# Patient Record
Sex: FEMALE | Race: BLACK OR AFRICAN AMERICAN | Employment: FULL TIME | URBAN - METROPOLITAN AREA
[De-identification: names, ages, dates, MRNs, and addresses within clinical notes are randomized per-mention and may not be internally consistent; named-entity substitution may affect disease eponyms.]

---

## 2017-12-18 PROBLEM — I50.9 ACUTE HEART FAILURE (HCC): Status: ACTIVE | Noted: 2017-12-18

## 2017-12-18 PROBLEM — I50.9 ACUTE DECOMPENSATED HEART FAILURE (HCC): Status: ACTIVE | Noted: 2017-12-18

## 2017-12-18 PROBLEM — R07.89 ATYPICAL CHEST PAIN: Status: ACTIVE | Noted: 2017-12-18

## 2017-12-18 PROBLEM — I10 ESSENTIAL HYPERTENSION: Status: ACTIVE | Noted: 2017-12-18

## 2017-12-18 PROBLEM — E11.65 TYPE 2 DIABETES MELLITUS WITH HYPERGLYCEMIA, WITHOUT LONG-TERM CURRENT USE OF INSULIN (HCC): Status: ACTIVE | Noted: 2017-12-18

## 2017-12-18 PROBLEM — E11.9 DIABETES (HCC): Status: ACTIVE | Noted: 2017-12-18

## 2017-12-18 PROBLEM — I50.21 ACUTE SYSTOLIC HEART FAILURE (HCC): Status: ACTIVE | Noted: 2017-12-18

## 2017-12-18 PROBLEM — R07.9 CHEST PAIN: Status: ACTIVE | Noted: 2017-12-18

## 2017-12-26 ENCOUNTER — OFFICE VISIT (OUTPATIENT)
Dept: CARDIOLOGY CLINIC | Age: 47
End: 2017-12-26

## 2017-12-26 VITALS
SYSTOLIC BLOOD PRESSURE: 138 MMHG | DIASTOLIC BLOOD PRESSURE: 80 MMHG | WEIGHT: 214 LBS | HEIGHT: 64 IN | BODY MASS INDEX: 36.54 KG/M2 | HEART RATE: 95 BPM | OXYGEN SATURATION: 97 %

## 2017-12-26 DIAGNOSIS — I50.22 CHRONIC SYSTOLIC HEART FAILURE (HCC): Primary | ICD-10-CM

## 2017-12-26 LAB
ALBUMIN SERPL-MCNC: 4.1 G/DL (ref 3.4–5)
ANION GAP SERPL CALCULATED.3IONS-SCNC: 12 MMOL/L (ref 3–16)
BUN BLDV-MCNC: 18 MG/DL (ref 7–20)
CALCIUM SERPL-MCNC: 9.1 MG/DL (ref 8.3–10.6)
CHLORIDE BLD-SCNC: 105 MMOL/L (ref 99–110)
CO2: 28 MMOL/L (ref 21–32)
CREAT SERPL-MCNC: 1.1 MG/DL (ref 0.6–1.1)
GFR AFRICAN AMERICAN: >60
GFR NON-AFRICAN AMERICAN: 53
GLUCOSE BLD-MCNC: 142 MG/DL (ref 70–99)
PHOSPHORUS: 3.9 MG/DL (ref 2.5–4.9)
POTASSIUM SERPL-SCNC: 3.9 MMOL/L (ref 3.5–5.1)
SODIUM BLD-SCNC: 145 MMOL/L (ref 136–145)

## 2017-12-26 PROCEDURE — 99214 OFFICE O/P EST MOD 30 MIN: CPT | Performed by: INTERNAL MEDICINE

## 2017-12-26 NOTE — PROGRESS NOTES
[facility]  Cardiology Consult Note      Xavier Garcia  1970, 49 y.o.      CC: \" I was a little messy this weekend. \"              Aguilar Lesley:      HPI:   This is a 49 y.o. female who presented to the hospital with complaints of progressive shortness of breath, LE edema, and chest pains. The DURBIN worsened over the past 1-2 weeks to symptoms at rest with orthopnea. She endorsed chest \"tightness\" and decided to seek medical attention. She reported noncompliance with medications and CPAP. She smokes 1 ppd. Patient says she had an angiogram many years ago and had \"everything was normal.\"      Past Medical History:   Diagnosis Date    Diabetes mellitus ([address]     Hypertension     Hypokalemia     Nicotine addiction     LAURA (obstructive sleep apnea)       Past Surgical History:   Procedure Laterality Date    CARPAL TUNNEL RELEASE        History reviewed. No pertinent family history.    Social History   Substance Use Topics    Smoking status: Current Every Day Smoker     Packs/day: 0.75     Types: Cigarettes    Smokeless tobacco: Never Used    Alcohol use Yes      Comment: occ     No Known Allergies      Review of Systems -   Constitutional: Negative for weight gain/loss; malaise, fever  Respiratory: Negative for Asthma;  cough and hemoptysis  Cardiovascular: Negative for palpitations,dizziness   Gastrointestinal: Negative for abd.pain; constipation/diarrhea;    Genitourinary: Negative for stones; hematuria; frequency hesitancy  Integumentt: Negative for rash or pruritis  Hematologic/lymphatic: Negative for blood dyscrasia; leukemia/lymphoma  Musculoskeletal: Negative for Connective tissue disease  Neurological:  Negative for Seizure   Behavioral/Psych:Negative for Bipolar disorder, Schizophrenia; Dementia  Endocrine: negative for thyroid, parathyroid disease    Physical Examination:    /80 (Site: Left Arm, Position: Sitting)   Pulse 95   Ht 5' 4\" (1.626 m)   Wt 214 lb (97.1 kg) SpO2 97%   BMI 36.73 kg/m²    HEENT:  Face: Atraumatic, Conjunctiva: Pink; non icteric,  Mucous Memb:  Moist, No thyromegaly or Lymphadenopathy  Respiratory:  Resp Assessment: normal, Resp Auscultation: clear  Cardiovascular: Auscultation: nl S1 & S2, Palpation:  Nl PMI; No heaves or thrills, JVP:  normal  Abdomen: Soft, non-tender, Normal bowel sounds,  No organomegaly  Extremities: No Cyanosis or Clubbing  Neurological: Oriented to time, place, and person, Non-anxious  Psychiatric: Normal mood and affect  Skin: Warm and dry,  No rash seen     Outpatient Prescriptions Marked as Taking for the 12/26/17 encounter (Office Visit) with Bobo Abdi MD   Medication Sig Dispense Refill    aspirin 81 MG chewable tablet Take 1 tablet by mouth daily 30 tablet 3    atorvastatin (LIPITOR) 10 MG tablet Take 1 tablet by mouth daily 30 tablet 3    lisinopril (PRINIVIL;ZESTRIL) 20 MG tablet Take 1 tablet by mouth daily 30 tablet 3    carvedilol (COREG) 6.25 MG tablet Take 1 tablet by mouth 2 times daily (with meals) 60 tablet 3    furosemide (LASIX) 40 MG tablet Take 1 tablet by mouth daily 60 tablet 3    nicotine (NICODERM CQ) 14 MG/24HR Place 1 patch onto the skin daily 30 patch 3    potassium chloride (KLOR-CON) 20 MEQ packet Take 20 mEq by mouth daily 30 each 3    metFORMIN (GLUCOPHAGE) 500 MG tablet Take 500 mg by mouth 2 times daily (with meals)       albuterol sulfate  (90 Base) MCG/ACT inhaler Inhale 2 puffs into the lungs every 6 hours as needed for Wheezing or Shortness of Breath       Labs:   Lab Results   Component Value Date    HDL 36 12/19/2017    LDLCALC 122 12/19/2017    TRIG 116 12/19/2017       EKG: none today    ECHO: 12/19/17   Overall left ventricular systolic function appears moderately reduced.   Ejection fraction is visually estimated to be around 40%. Mildly dilated   left ventricle.  There is mildly increased left ventricular wall thickness.   Diastolic filling parameters suggest grade II diastolic dysfunction.   Normal right ventricular size and function.   The left atrium is mildly dilated.   Mild eccentric mitral regurgitation with posteriorly directed jet.   Trivial tricuspid regurgitation.   A bubble study was performed and fails to show evidence of right to left   shunting. ASSESSMENT AND PLAN:    Patient is here today for hsp f/u. She was taking medication as I walked into the exam room. She offers that she feels better since discharge. After further discussion, she reported having an angiogram several years ago and \"everything was normal.\"  I will defer to Dr. Dona Farfan to determine if stress test is needed. We discussed the importance of medication compliance, she verbalized understanding. She continues to smoke 1 ppd and says she is trying to cut back. Spent 3-5 minutes discussing smoking cessation. I will order renal panel today. She will need to follow up with Dr. Dona Farfan or Yang Seals in 3 months. Thank you very much for allowing me to participate in the care of your patient. Please do not hesitate to contact me if you have any questions.         Sincerely,    Caro Pascal M.D  [facility], [address] [place] [address]  Ph: [phone]  Fax: [phone]

## 2017-12-26 NOTE — PATIENT INSTRUCTIONS
sodium\" may still have too much sodium. Be sure to read the label to see how much sodium you are getting. · Buy fresh vegetables, or frozen vegetables without added sauces. Buy low-sodium versions of canned vegetables, soups, and other canned goods. Prepare low-sodium meals  · Cut back on the amount of salt you use in cooking. This will help you adjust to the taste. Do not add salt after cooking. One teaspoon of salt has about 2,300 mg of sodium. · Take the salt shaker off the table. · Flavor your food with garlic, lemon juice, onion, vinegar, herbs, and spices. Do not use soy sauce, lite soy sauce, steak sauce, onion salt, garlic salt, celery salt, mustard, or ketchup on your food. · Use low-sodium salad dressings, sauces, and ketchup. Or make your own salad dressings and sauces without adding salt. · Use less salt (or none) when recipes call for it. You can often use half the salt a recipe calls for without losing flavor. Other foods such as rice, pasta, and grains do not need added salt. · Rinse canned vegetables, and cook them in fresh water. This removes some-but not all-of the salt. · Avoid water that is naturally high in sodium or that has been treated with water softeners, which add sodium. Call your local water company to find out the sodium content of your water supply. If you buy bottled water, read the label and choose a sodium-free brand. Avoid high-sodium foods  · Avoid eating:  ¨ Smoked, cured, salted, and canned meat, fish, and poultry. ¨ Ham, melissa, hot dogs, and luncheon meats. ¨ Regular, hard, and processed cheese and regular peanut butter. ¨ Crackers with salted tops, and other salted snack foods such as pretzels, chips, and salted popcorn. ¨ Frozen prepared meals, unless labeled low-sodium. ¨ Canned and dried soups, broths, and bouillon, unless labeled sodium-free or low-sodium. ¨ Canned vegetables, unless labeled sodium-free or low-sodium.   ¨ Western Kayla fries, pizza, tacos, and other fast foods. ¨ Pickles, olives, ketchup, and other condiments, especially soy sauce, unless labeled sodium-free or low-sodium. Where can you learn more? Go to https://[employer]. org and sign in to your [employer] account. Enter S600 in the [facility] box to learn more about \"Low Sodium Diet (2,000 Milligram): Care Instructions. \"     If you do not have an account, please click on the \"Sign Up Now\" link. Current as of: May 12, 2017  Content Version: 11.4  © 5077-5608 [employer]. Care instructions adapted under license by [facility] ([facility]). If you have questions about a medical condition or this instruction, always ask your healthcare professional. [address] any warranty or liability for your use of this information. Patient Education        Fluid Restriction: Care Instructions  Your Care Instructions    A buildup of fluid in the body can cause swelling and pain. Your doctor may suggest that you limit liquids, including foods that contain a lot of liquid. Limiting liquids is called fluid restriction. It will help your body get rid of the extra fluid. Your doctor may also recommend that you cut back on sodium in your diet. Keeping track of the amount of fluids you take in may help you feel better. It may also lower your risk of having to go to the hospital. Your doctor will tell you how much fluid you can have in a day. Follow-up care is a key part of your treatment and safety. Be sure to make and go to all appointments, and call your doctor if you are having problems. It's also a good idea to know your test results and keep a list of the medicines you take. How can you care for yourself at home? · Find a way of tracking the fluids you take in that works for you. Here are two methods you can try:  ¨ Write down how much you drink throughout the day. ¨ Keep a container filled with the amount of liquid allowed for the day.  As you drink liquids of salt a day, including all the salt you eat in cooked or packaged foods. Usually, you have to limit the amount of liquids you drink only if your heart failure is severe. Limiting sodium alone often is enough to help your body get rid of extra fluids. However, your doctor may tell you to limit your fluid intake to a set amount each day. Follow-up care is a key part of your treatment and safety. Be sure to make and go to all appointments, and call your doctor if you are having problems. It's also a good idea to know your test results and keep a list of the medicines you take. How can you care for yourself at home? Read food labels  · Read food labels on cans and food packages. The labels tell you how much sodium is in each serving. Make sure that you look at the serving size. If you eat more than the serving size, you have eaten more sodium than is listed for one serving. · Food labels also tell you the Percent Daily Value. If the Percent Daily Value says 50%, it means that you will get at least 50% of all the sodium you need for the entire day in one serving. Choose products with low Percent Daily Values for sodium. · Be aware that sodium can come in forms other than salt, including monosodium glutamate (MSG), sodium citrate, and sodium bicarbonate (baking soda). MSG is often added to Asian food. You can sometimes ask for food without MSG or salt. Buy low-sodium foods  · Buy foods that are labeled \"unsalted\" (no salt added), \"sodium-free\" (less than 5 mg of sodium per serving), or \"low-sodium\" (less than 140 mg of sodium per serving). A food labeled \"light sodium\" has less than half of the full-sodium version of that food. Foods labeled \"reduced-sodium\" may still have too much sodium. · Buy fresh vegetables or plain, frozen vegetables. Buy low-sodium versions of canned vegetables, soups, and other canned goods. Prepare low-sodium meals  · Use less salt each day when cooking.  Reducing salt in this way will

## 2018-05-10 ENCOUNTER — OFFICE VISIT (OUTPATIENT)
Dept: CARDIOLOGY CLINIC | Age: 48
End: 2018-05-10

## 2018-05-10 VITALS
BODY MASS INDEX: 34.66 KG/M2 | HEIGHT: 64 IN | HEART RATE: 93 BPM | SYSTOLIC BLOOD PRESSURE: 150 MMHG | DIASTOLIC BLOOD PRESSURE: 80 MMHG | OXYGEN SATURATION: 97 % | WEIGHT: 203 LBS

## 2018-05-10 DIAGNOSIS — R05.8 COUGH DUE TO ACE INHIBITOR: ICD-10-CM

## 2018-05-10 DIAGNOSIS — R07.9 EXERTIONAL CHEST PAIN: Primary | ICD-10-CM

## 2018-05-10 DIAGNOSIS — T46.4X5A COUGH DUE TO ACE INHIBITOR: ICD-10-CM

## 2018-05-10 DIAGNOSIS — I50.42 CHRONIC COMBINED SYSTOLIC AND DIASTOLIC HEART FAILURE (HCC): ICD-10-CM

## 2018-05-10 DIAGNOSIS — R60.0 BILATERAL LOWER EXTREMITY EDEMA: ICD-10-CM

## 2018-05-10 PROCEDURE — 99215 OFFICE O/P EST HI 40 MIN: CPT | Performed by: INTERNAL MEDICINE

## 2018-05-10 RX ORDER — METOPROLOL SUCCINATE 50 MG/1
50 TABLET, EXTENDED RELEASE ORAL DAILY
Qty: 30 TABLET | Refills: 3 | Status: SHIPPED | OUTPATIENT
Start: 2018-05-10 | End: 2019-05-08 | Stop reason: SDUPTHER

## 2018-05-10 RX ORDER — VALSARTAN AND HYDROCHLOROTHIAZIDE 320; 25 MG/1; MG/1
1 TABLET, FILM COATED ORAL DAILY
Qty: 30 TABLET | Refills: 3 | Status: SHIPPED | OUTPATIENT
Start: 2018-05-10 | End: 2018-10-18

## 2018-05-10 RX ORDER — SPIRONOLACTONE 25 MG/1
25 TABLET ORAL DAILY
Qty: 30 TABLET | Refills: 3 | Status: SHIPPED | OUTPATIENT
Start: 2018-05-10 | End: 2018-10-09 | Stop reason: SDUPTHER

## 2018-05-14 ENCOUNTER — HOSPITAL ENCOUNTER (OUTPATIENT)
Dept: NON INVASIVE DIAGNOSTICS | Age: 48
Discharge: HOME | End: 2018-05-14
Attending: INTERNAL MEDICINE | Admitting: INTERNAL MEDICINE

## 2018-05-14 DIAGNOSIS — I50.42 CHRONIC COMBINED SYSTOLIC AND DIASTOLIC HEART FAILURE (HCC): ICD-10-CM

## 2018-05-14 DIAGNOSIS — R07.9 CHEST PAIN: ICD-10-CM

## 2018-05-14 DIAGNOSIS — R60.0 BILATERAL LOWER EXTREMITY EDEMA: ICD-10-CM

## 2018-05-14 LAB
ANION GAP SERPL CALCULATED.3IONS-SCNC: 17 MMOL/L (ref 3–16)
BUN BLDV-MCNC: 13 MG/DL (ref 7–20)
CALCIUM SERPL-MCNC: 9.1 MG/DL (ref 8.3–10.6)
CHLORIDE BLD-SCNC: 103 MMOL/L (ref 99–110)
CO2: 27 MMOL/L (ref 21–32)
CREAT SERPL-MCNC: 0.9 MG/DL (ref 0.6–1.1)
GFR AFRICAN AMERICAN: >60
GFR NON-AFRICAN AMERICAN: >60
GLUCOSE BLD-MCNC: 163 MG/DL (ref 70–99)
POTASSIUM SERPL-SCNC: 3.6 MMOL/L (ref 3.5–5.1)
SODIUM BLD-SCNC: 147 MMOL/L (ref 136–145)

## 2018-06-13 ENCOUNTER — OFFICE VISIT (OUTPATIENT)
Dept: CARDIOLOGY CLINIC | Age: 48
End: 2018-06-13

## 2018-06-13 VITALS
WEIGHT: 202 LBS | HEART RATE: 75 BPM | HEIGHT: 64 IN | SYSTOLIC BLOOD PRESSURE: 134 MMHG | BODY MASS INDEX: 34.49 KG/M2 | OXYGEN SATURATION: 95 % | DIASTOLIC BLOOD PRESSURE: 80 MMHG

## 2018-06-13 DIAGNOSIS — I50.42 CHRONIC COMBINED SYSTOLIC AND DIASTOLIC HEART FAILURE (HCC): ICD-10-CM

## 2018-06-13 DIAGNOSIS — R07.9 CHEST PAIN, UNSPECIFIED TYPE: Primary | ICD-10-CM

## 2018-06-13 DIAGNOSIS — R05.9 COUGH: ICD-10-CM

## 2018-06-13 DIAGNOSIS — R60.0 BILATERAL LEG EDEMA: ICD-10-CM

## 2018-06-13 LAB
ALBUMIN SERPL-MCNC: 4.1 G/DL (ref 3.4–5)
ANION GAP SERPL CALCULATED.3IONS-SCNC: 11 MMOL/L (ref 3–16)
BUN BLDV-MCNC: 12 MG/DL (ref 7–20)
CALCIUM SERPL-MCNC: 9.2 MG/DL (ref 8.3–10.6)
CHLORIDE BLD-SCNC: 100 MMOL/L (ref 99–110)
CO2: 32 MMOL/L (ref 21–32)
CREAT SERPL-MCNC: 0.8 MG/DL (ref 0.6–1.1)
GFR AFRICAN AMERICAN: >60
GFR NON-AFRICAN AMERICAN: >60
GLUCOSE BLD-MCNC: 227 MG/DL (ref 70–99)
PHOSPHORUS: 4.2 MG/DL (ref 2.5–4.9)
POTASSIUM SERPL-SCNC: 3.8 MMOL/L (ref 3.5–5.1)
SODIUM BLD-SCNC: 143 MMOL/L (ref 136–145)

## 2018-06-13 PROCEDURE — 99214 OFFICE O/P EST MOD 30 MIN: CPT | Performed by: INTERNAL MEDICINE

## 2018-10-09 RX ORDER — SPIRONOLACTONE 25 MG/1
25 TABLET ORAL DAILY
Qty: 30 TABLET | Refills: 3 | Status: SHIPPED | OUTPATIENT
Start: 2018-10-09 | End: 2019-05-01

## 2018-10-18 ENCOUNTER — OFFICE VISIT (OUTPATIENT)
Dept: CARDIOLOGY CLINIC | Age: 48
End: 2018-10-18
Payer: COMMERCIAL

## 2018-10-18 VITALS
HEART RATE: 72 BPM | BODY MASS INDEX: 36.7 KG/M2 | OXYGEN SATURATION: 98 % | HEIGHT: 64 IN | DIASTOLIC BLOOD PRESSURE: 84 MMHG | SYSTOLIC BLOOD PRESSURE: 138 MMHG | WEIGHT: 215 LBS

## 2018-10-18 DIAGNOSIS — I50.42 CHRONIC COMBINED SYSTOLIC AND DIASTOLIC HEART FAILURE (HCC): Primary | ICD-10-CM

## 2018-10-18 DIAGNOSIS — R60.0 LEG EDEMA: ICD-10-CM

## 2018-10-18 DIAGNOSIS — Z72.0 TOBACCO ABUSE: ICD-10-CM

## 2018-10-18 PROCEDURE — 99214 OFFICE O/P EST MOD 30 MIN: CPT | Performed by: INTERNAL MEDICINE

## 2018-10-18 RX ORDER — FUROSEMIDE 40 MG/1
40 TABLET ORAL DAILY
Qty: 180 TABLET | Refills: 5 | Status: SHIPPED | OUTPATIENT
Start: 2018-10-18 | End: 2019-05-24 | Stop reason: SDUPTHER

## 2018-10-18 RX ORDER — LOSARTAN POTASSIUM AND HYDROCHLOROTHIAZIDE 25; 100 MG/1; MG/1
1 TABLET ORAL DAILY
Qty: 90 TABLET | Refills: 5 | Status: SHIPPED | OUTPATIENT
Start: 2018-10-18 | End: 2019-05-10 | Stop reason: SDUPTHER

## 2018-11-14 ENCOUNTER — TELEPHONE (OUTPATIENT)
Dept: CARDIOLOGY CLINIC | Age: 48
End: 2018-11-14

## 2018-11-19 NOTE — TELEPHONE ENCOUNTER
May hold aldactone and see for one month.  If hair loss improves than stay off it; otherwise resume in a month

## 2018-11-23 ENCOUNTER — HOSPITAL ENCOUNTER (EMERGENCY)
Age: 48
Discharge: HOME | End: 2018-11-23
Payer: COMMERCIAL

## 2018-11-23 VITALS
TEMPERATURE: 97.8 F | DIASTOLIC BLOOD PRESSURE: 86 MMHG | SYSTOLIC BLOOD PRESSURE: 141 MMHG | OXYGEN SATURATION: 98 % | WEIGHT: 213.85 LBS | BODY MASS INDEX: 36.71 KG/M2 | HEART RATE: 87 BPM | RESPIRATION RATE: 18 BRPM

## 2018-11-23 DIAGNOSIS — S39.012A STRAIN OF LUMBAR REGION, INITIAL ENCOUNTER: ICD-10-CM

## 2018-11-23 DIAGNOSIS — J04.0 LARYNGITIS: Primary | ICD-10-CM

## 2018-11-23 LAB
EKG ATRIAL RATE: 79 BPM
EKG DIAGNOSIS: NORMAL
EKG P AXIS: 62 DEGREES
EKG P-R INTERVAL: 166 MS
EKG Q-T INTERVAL: 366 MS
EKG QRS DURATION: 84 MS
EKG QTC CALCULATION (BAZETT): 419 MS
EKG R AXIS: 5 DEGREES
EKG T AXIS: 171 DEGREES
EKG VENTRICULAR RATE: 79 BPM

## 2018-11-23 PROCEDURE — 93005 ELECTROCARDIOGRAM TRACING: CPT | Performed by: EMERGENCY MEDICINE

## 2018-11-23 PROCEDURE — 93010 ELECTROCARDIOGRAM REPORT: CPT | Performed by: INTERNAL MEDICINE

## 2018-11-23 PROCEDURE — 99283 EMERGENCY DEPT VISIT LOW MDM: CPT

## 2018-11-23 RX ORDER — ORPHENADRINE CITRATE 100 MG/1
100 TABLET, EXTENDED RELEASE ORAL 2 TIMES DAILY
Qty: 14 TABLET | Refills: 0 | Status: SHIPPED | OUTPATIENT
Start: 2018-11-23 | End: 2018-11-30

## 2018-11-23 RX ORDER — ALBUTEROL SULFATE 90 UG/1
2 AEROSOL, METERED RESPIRATORY (INHALATION) EVERY 6 HOURS PRN
Qty: 1 INHALER | Refills: 1 | Status: SHIPPED | OUTPATIENT
Start: 2018-11-23 | End: 2018-11-25

## 2018-11-23 ASSESSMENT — PAIN DESCRIPTION - PAIN TYPE
TYPE: ACUTE PAIN
TYPE: ACUTE PAIN

## 2018-11-23 ASSESSMENT — ENCOUNTER SYMPTOMS
GASTROINTESTINAL NEGATIVE: 1
BACK PAIN: 1
VOICE CHANGE: 1
RESPIRATORY NEGATIVE: 1

## 2018-11-23 ASSESSMENT — PAIN SCALES - GENERAL
PAINLEVEL_OUTOF10: 8
PAINLEVEL_OUTOF10: 4

## 2018-11-23 ASSESSMENT — PAIN DESCRIPTION - DESCRIPTORS: DESCRIPTORS: ACHING

## 2018-11-24 NOTE — ED PROVIDER NOTES
of swelling. No evidence of epiglottitis. No drooling. She speaks in full sentences and articulates well. Eyes: Pupils are equal, round, and reactive to light. Neck: Normal range of motion. Neck supple. No JVD present. Cardiovascular: Normal rate and regular rhythm. Pulmonary/Chest: Effort normal and breath sounds normal. No stridor. No respiratory distress. She has no wheezes. Neurological: She is alert and oriented to person, place, and time. Skin: Skin is warm and dry. Capillary refill takes less than 2 seconds. She is not diaphoretic. Nursing note and vitals reviewed. DIAGNOSTIC RESULTS     RADIOLOGY:   Non-plain film images such as CT, Ultrasound and MRI are read by the radiologist. Plain radiographic images are visualized and preliminarily interpreted by ALAB Jorge CNP with the below findings:        Interpretation per the Radiologist below, if available at the time of this note:    No orders to display     EKG reviewed per myself and interpreted per Dr. Ari Coleman sinus rhythm. No evidence of STEMI or acute ischemia. Ventricular rate 79, W[place] interval 166, QRS 84, . LABS:  Labs Reviewed - No data to display    All other labs were within normal range or not returned as of this dictation. EMERGENCY DEPARTMENT COURSE and DIFFERENTIAL DIAGNOSIS/MDM:   Vitals:    Vitals:    11/23/18 1059 11/23/18 1154   BP: 137/74 (!) 141/86   Pulse: 93 87   Resp: 16 18   Temp: 97.8 °F (36.6 °C)    TempSrc: Oral    SpO2: 97% 98%   Weight: 213 lb 13.5 oz (97 kg)      Medications - No data to display    MDM  Patient was seen and evaluated per myself. Dr. Ari Coleman was present and available for consultation as needed. EKG was performed per nursing protocol. Patient is not having shortness of breath or chest pain. Patient denied having chest pain or active shortness of breath during my exam. She does have a laryngitic voice.  Head ears eyes nose and throat exam is negative for any evidence of

## 2018-11-25 ENCOUNTER — HOSPITAL ENCOUNTER (EMERGENCY)
Age: 48
Discharge: HOME | End: 2018-11-25
Attending: EMERGENCY MEDICINE
Payer: COMMERCIAL

## 2018-11-25 ENCOUNTER — APPOINTMENT (OUTPATIENT)
Dept: GENERAL RADIOLOGY | Age: 48
End: 2018-11-25
Payer: COMMERCIAL

## 2018-11-25 VITALS
HEART RATE: 81 BPM | SYSTOLIC BLOOD PRESSURE: 154 MMHG | BODY MASS INDEX: 36.56 KG/M2 | DIASTOLIC BLOOD PRESSURE: 89 MMHG | TEMPERATURE: 98.1 F | RESPIRATION RATE: 18 BRPM | WEIGHT: 213 LBS | OXYGEN SATURATION: 96 %

## 2018-11-25 DIAGNOSIS — J04.0 LARYNGITIS: Primary | ICD-10-CM

## 2018-11-25 DIAGNOSIS — R05.9 COUGH: ICD-10-CM

## 2018-11-25 LAB
A/G RATIO: 1.2 (ref 1.1–2.2)
ALBUMIN SERPL-MCNC: 3.9 G/DL (ref 3.4–5)
ALP BLD-CCNC: 87 U/L (ref 40–129)
ALT SERPL-CCNC: 11 U/L (ref 10–40)
ANION GAP SERPL CALCULATED.3IONS-SCNC: 9 MMOL/L (ref 3–16)
AST SERPL-CCNC: 14 U/L (ref 15–37)
BASOPHILS ABSOLUTE: 0.1 K/UL (ref 0–0.2)
BASOPHILS RELATIVE PERCENT: 1.2 %
BILIRUB SERPL-MCNC: <0.2 MG/DL (ref 0–1)
BILIRUBIN URINE: NEGATIVE
BLOOD, URINE: ABNORMAL
BUN BLDV-MCNC: 12 MG/DL (ref 7–20)
CALCIUM SERPL-MCNC: 8.9 MG/DL (ref 8.3–10.6)
CHLORIDE BLD-SCNC: 102 MMOL/L (ref 99–110)
CLARITY: CLEAR
CO2: 31 MMOL/L (ref 21–32)
COLOR: YELLOW
CREAT SERPL-MCNC: 0.8 MG/DL (ref 0.6–1.1)
EOSINOPHILS ABSOLUTE: 0.3 K/UL (ref 0–0.6)
EOSINOPHILS RELATIVE PERCENT: 2.3 %
EPITHELIAL CELLS, UA: 2 /HPF (ref 0–5)
GFR AFRICAN AMERICAN: >60
GFR NON-AFRICAN AMERICAN: >60
GLOBULIN: 3.3 G/DL
GLUCOSE BLD-MCNC: 180 MG/DL (ref 70–99)
GLUCOSE URINE: NEGATIVE MG/DL
GONADOTROPIN, CHORIONIC (HCG) QUANT: 6.3 MIU/ML
HCG QUALITATIVE: POSITIVE
HCT VFR BLD CALC: 41.9 % (ref 36–48)
HEMOGLOBIN: 13.8 G/DL (ref 12–16)
HYALINE CASTS: 0 /LPF (ref 0–8)
KETONES, URINE: NEGATIVE MG/DL
LEUKOCYTE ESTERASE, URINE: NEGATIVE
LYMPHOCYTES ABSOLUTE: 3 K/UL (ref 1–5.1)
LYMPHOCYTES RELATIVE PERCENT: 23.8 %
MCH RBC QN AUTO: 26.1 PG (ref 26–34)
MCHC RBC AUTO-ENTMCNC: 32.8 G/DL (ref 31–36)
MCV RBC AUTO: 79.3 FL (ref 80–100)
MICROSCOPIC EXAMINATION: YES
MONOCYTES ABSOLUTE: 1.1 K/UL (ref 0–1.3)
MONOCYTES RELATIVE PERCENT: 9.1 %
NEUTROPHILS ABSOLUTE: 7.9 K/UL (ref 1.7–7.7)
NEUTROPHILS RELATIVE PERCENT: 63.6 %
NITRITE, URINE: NEGATIVE
PDW BLD-RTO: 14.8 % (ref 12.4–15.4)
PH UA: 6
PLATELET # BLD: 272 K/UL (ref 135–450)
PMV BLD AUTO: 9.6 FL (ref 5–10.5)
POTASSIUM SERPL-SCNC: 3.4 MMOL/L (ref 3.5–5.1)
PRO-BNP: 1218 PG/ML (ref 0–124)
PROTEIN UA: NEGATIVE MG/DL
RBC # BLD: 5.28 M/UL (ref 4–5.2)
RBC UA: 3 /HPF (ref 0–4)
SODIUM BLD-SCNC: 142 MMOL/L (ref 136–145)
SPECIFIC GRAVITY UA: 1.01
TOTAL PROTEIN: 7.2 G/DL (ref 6.4–8.2)
TROPONIN: <0.01 NG/ML
TROPONIN: <0.01 NG/ML
URINE REFLEX TO CULTURE: ABNORMAL
URINE TYPE: ABNORMAL
UROBILINOGEN, URINE: 0.2 E.U./DL
WBC # BLD: 12.4 K/UL (ref 4–11)
WBC UA: 1 /HPF (ref 0–5)

## 2018-11-25 PROCEDURE — 94640 AIRWAY INHALATION TREATMENT: CPT

## 2018-11-25 PROCEDURE — 6360000002 HC RX W HCPCS: Performed by: NURSE PRACTITIONER

## 2018-11-25 PROCEDURE — 84702 CHORIONIC GONADOTROPIN TEST: CPT

## 2018-11-25 PROCEDURE — 99284 EMERGENCY DEPT VISIT MOD MDM: CPT

## 2018-11-25 PROCEDURE — 94664 DEMO&/EVAL PT USE INHALER: CPT

## 2018-11-25 PROCEDURE — 83880 ASSAY OF NATRIURETIC PEPTIDE: CPT

## 2018-11-25 PROCEDURE — 84484 ASSAY OF TROPONIN QUANT: CPT

## 2018-11-25 PROCEDURE — 6370000000 HC RX 637 (ALT 250 FOR IP): Performed by: NURSE PRACTITIONER

## 2018-11-25 PROCEDURE — 84703 CHORIONIC GONADOTROPIN ASSAY: CPT

## 2018-11-25 PROCEDURE — 93005 ELECTROCARDIOGRAM TRACING: CPT | Performed by: NURSE PRACTITIONER

## 2018-11-25 PROCEDURE — 80053 COMPREHEN METABOLIC PANEL: CPT

## 2018-11-25 PROCEDURE — 71046 X-RAY EXAM CHEST 2 VIEWS: CPT

## 2018-11-25 PROCEDURE — 96374 THER/PROPH/DIAG INJ IV PUSH: CPT

## 2018-11-25 PROCEDURE — 81001 URINALYSIS AUTO W/SCOPE: CPT

## 2018-11-25 PROCEDURE — 85025 COMPLETE CBC W/AUTO DIFF WBC: CPT

## 2018-11-25 PROCEDURE — 94761 N-INVAS EAR/PLS OXIMETRY MLT: CPT

## 2018-11-25 RX ORDER — ALBUTEROL SULFATE 90 UG/1
AEROSOL, METERED RESPIRATORY (INHALATION)
Qty: 1 INHALER | Refills: 3 | Status: SHIPPED | OUTPATIENT
Start: 2018-11-25

## 2018-11-25 RX ORDER — DEXAMETHASONE SODIUM PHOSPHATE 4 MG/ML
10 INJECTION, SOLUTION INTRA-ARTICULAR; INTRALESIONAL; INTRAMUSCULAR; INTRAVENOUS; SOFT TISSUE ONCE
Status: COMPLETED | OUTPATIENT
Start: 2018-11-25 | End: 2018-11-25

## 2018-11-25 RX ORDER — IBUPROFEN 800 MG/1
800 TABLET ORAL EVERY 8 HOURS PRN
Qty: 30 TABLET | Refills: 0 | Status: SHIPPED | OUTPATIENT
Start: 2018-11-25 | End: 2019-05-08 | Stop reason: CLARIF

## 2018-11-25 RX ORDER — IPRATROPIUM BROMIDE AND ALBUTEROL SULFATE 2.5; .5 MG/3ML; MG/3ML
1 SOLUTION RESPIRATORY (INHALATION) ONCE
Status: COMPLETED | OUTPATIENT
Start: 2018-11-25 | End: 2018-11-25

## 2018-11-25 RX ORDER — BENZONATATE 100 MG/1
100 CAPSULE ORAL 3 TIMES DAILY PRN
Qty: 18 CAPSULE | Refills: 0 | Status: SHIPPED | OUTPATIENT
Start: 2018-11-25 | End: 2018-12-01

## 2018-11-25 RX ADMIN — DEXAMETHASONE SODIUM PHOSPHATE 10 MG: 4 INJECTION, SOLUTION INTRAMUSCULAR; INTRAVENOUS at 19:12

## 2018-11-25 RX ADMIN — IPRATROPIUM BROMIDE AND ALBUTEROL SULFATE 1 AMPULE: .5; 3 SOLUTION RESPIRATORY (INHALATION) at 18:05

## 2018-11-25 ASSESSMENT — ENCOUNTER SYMPTOMS
SINUS PAIN: 0
VOMITING: 0
SHORTNESS OF BREATH: 0
ABDOMINAL PAIN: 0
DIARRHEA: 0
TROUBLE SWALLOWING: 0
SORE THROAT: 1
NAUSEA: 0
VOICE CHANGE: 1
COUGH: 1

## 2018-11-25 NOTE — ED PROVIDER NOTES
Maxine Richter verbalized understanding and discharged home. Emergency room course included a breathing treatment and steroids. Patient was resting comfortably on my reassessment. This is an unhealthy 39-year-old female that smokes and does not wear CPAP for her LAURA. Initial and repeat troponin were both negative with a normal EKG my clinical suspicion for ACS is low. She had a normal stress test at the beginning of this year. She feels better and will be given a prescription for cough medicine and albuterol inhaler. I advised her to follow-up with her PCP. I feel she is appropriate and safe for discharge home at this time. The patient has been evaluated and the history and physical exam suggest a benign etiology. Patient's oxygen saturations are good. I do not believe the patient's symptoms today are due to pulmonary embolism, pulmonary edema, pneumonia, pneumothorax, ACS, CHF, MI, thoracic aortic dissection, significant pericarditis, or acute abdomen, status asthmaticus, acute respiratory failure, profound anemia or metabolic abnormality, amongst other more emergent diagnostic considerations. I feel the patient can be safely discharged to home with outpatient follow up. Instructions have been given for the patient to return to the Emergency Department Immediately for any worsening of the symptoms, including but not limited to increased pain, shortness of breath, abdominal pain or weakness. At this time, the evidence for any other entities in the differential is insufficient to justify any further testing. This was explained to the patient. The patient was advised that persistent or worsening symptoms will require further evaluation. The patient tolerated their visit well. They were seen and evaluated by the attending physician, Colten Knott MD who agreed with the assessment and plan.   The patient and / or the family were informed of the results of any tests, a time was given to answer questions, a plan was proposed and they agreed with plan. FINAL IMPRESSION      1. Laryngitis    2.  Cough          DISPOSITION/PLAN   DISPOSITION        PATIENT REFERREDTO:  [facility]  [phone]    Schedule an appointment as soon as possible for a visit       Blanca Goss, [address] [facility] [address] [place] [address]  [phone]    Schedule an appointment as soon as possible for a visit       [address] [facility] Emergency Department  [address] [id number] [phone]  Go to   As needed      DISCHARGE MEDICATIONS:  Discharge Medication List as of 11/25/2018  9:13 PM      START taking these medications    Details   ibuprofen (ADVIL;MOTRIN) 800 MG tablet Take 1 tablet by mouth every 8 hours as needed for Pain, Disp-30 tablet, R-0Print      benzonatate (TESSALON PERLES) 100 MG capsule Take 1 capsule by mouth 3 times daily as needed for Cough, Disp-18 capsule, R-0Print             DISCONTINUED MEDICATIONS:  Discharge Medication List as of 11/25/2018  9:13 PM                 (Please note that portions ofthis note were completed with a voice recognition program.  Efforts were made to edit the dictations but occasionally words are mis-transcribed.)    ALBA Saunders CNP (electronically signed)            ALBA Saunders CNP  11/26/18 0216

## 2018-11-25 NOTE — ED NOTES
Bed: S-46  Expected date:   Expected time:   Means of arrival:   Comments:  Allegra Serrano RN  11/25/18 4254

## 2018-11-26 LAB
EKG ATRIAL RATE: 70 BPM
EKG DIAGNOSIS: NORMAL
EKG P AXIS: 66 DEGREES
EKG P-R INTERVAL: 164 MS
EKG Q-T INTERVAL: 416 MS
EKG QRS DURATION: 86 MS
EKG QTC CALCULATION (BAZETT): 449 MS
EKG R AXIS: 66 DEGREES
EKG T AXIS: -1 DEGREES
EKG VENTRICULAR RATE: 70 BPM

## 2018-11-26 PROCEDURE — 93010 ELECTROCARDIOGRAM REPORT: CPT | Performed by: INTERNAL MEDICINE

## 2019-05-01 ENCOUNTER — APPOINTMENT (OUTPATIENT)
Dept: GENERAL RADIOLOGY | Age: 49
DRG: 293 | End: 2019-05-01
Payer: COMMERCIAL

## 2019-05-01 ENCOUNTER — HOSPITAL ENCOUNTER (INPATIENT)
Age: 49
LOS: 1 days | Discharge: HOME | DRG: 293 | End: 2019-05-03
Attending: EMERGENCY MEDICINE | Admitting: HOSPITALIST
Payer: COMMERCIAL

## 2019-05-01 DIAGNOSIS — R77.8 ELEVATED TROPONIN: ICD-10-CM

## 2019-05-01 DIAGNOSIS — I50.9 ACUTE ON CHRONIC CONGESTIVE HEART FAILURE, UNSPECIFIED HEART FAILURE TYPE (HCC): Primary | ICD-10-CM

## 2019-05-01 PROBLEM — R07.9 CHEST PAIN: Status: ACTIVE | Noted: 2019-05-01

## 2019-05-01 LAB
A/G RATIO: 1.3 (ref 1.1–2.2)
ALBUMIN SERPL-MCNC: 3.5 G/DL (ref 3.4–5)
ALP BLD-CCNC: 114 U/L (ref 40–129)
ALT SERPL-CCNC: 55 U/L (ref 10–40)
ANION GAP SERPL CALCULATED.3IONS-SCNC: 10 MMOL/L (ref 3–16)
AST SERPL-CCNC: 59 U/L (ref 15–37)
BASOPHILS ABSOLUTE: 0.1 K/UL (ref 0–0.2)
BASOPHILS RELATIVE PERCENT: 0.9 %
BILIRUB SERPL-MCNC: 0.3 MG/DL (ref 0–1)
BUN BLDV-MCNC: 13 MG/DL (ref 7–20)
CALCIUM SERPL-MCNC: 8.8 MG/DL (ref 8.3–10.6)
CHLORIDE BLD-SCNC: 103 MMOL/L (ref 99–110)
CO2: 29 MMOL/L (ref 21–32)
CREAT SERPL-MCNC: 1 MG/DL (ref 0.6–1.1)
EOSINOPHILS ABSOLUTE: 0.3 K/UL (ref 0–0.6)
EOSINOPHILS RELATIVE PERCENT: 3.3 %
GFR AFRICAN AMERICAN: >60
GFR NON-AFRICAN AMERICAN: 59
GLOBULIN: 2.8 G/DL
GLUCOSE BLD-MCNC: 155 MG/DL (ref 70–99)
HCG QUALITATIVE: NEGATIVE
HCT VFR BLD CALC: 37.6 % (ref 36–48)
HEMOGLOBIN: 12.5 G/DL (ref 12–16)
LV EF: 40 %
LV EF: 40 %
LVEF MODALITY: NORMAL
LVEF MODALITY: NORMAL
LYMPHOCYTES ABSOLUTE: 2.9 K/UL (ref 1–5.1)
LYMPHOCYTES RELATIVE PERCENT: 27.1 %
MCH RBC QN AUTO: 25.6 PG (ref 26–34)
MCHC RBC AUTO-ENTMCNC: 33.2 G/DL (ref 31–36)
MCV RBC AUTO: 77.1 FL (ref 80–100)
MONOCYTES ABSOLUTE: 0.9 K/UL (ref 0–1.3)
MONOCYTES RELATIVE PERCENT: 8.2 %
NEUTROPHILS ABSOLUTE: 6.4 K/UL (ref 1.7–7.7)
NEUTROPHILS RELATIVE PERCENT: 60.5 %
PDW BLD-RTO: 15.1 % (ref 12.4–15.4)
PLATELET # BLD: 278 K/UL (ref 135–450)
PMV BLD AUTO: 9.6 FL (ref 5–10.5)
POTASSIUM SERPL-SCNC: 3.4 MMOL/L (ref 3.5–5.1)
PRO-BNP: 3414 PG/ML (ref 0–124)
RBC # BLD: 4.88 M/UL (ref 4–5.2)
SODIUM BLD-SCNC: 142 MMOL/L (ref 136–145)
TOTAL PROTEIN: 6.3 G/DL (ref 6.4–8.2)
TROPONIN: 0.04 NG/ML
TROPONIN: 0.04 NG/ML
TROPONIN: <0.01 NG/ML
WBC # BLD: 10.5 K/UL (ref 4–11)

## 2019-05-01 PROCEDURE — 84703 CHORIONIC GONADOTROPIN ASSAY: CPT

## 2019-05-01 PROCEDURE — 80053 COMPREHEN METABOLIC PANEL: CPT

## 2019-05-01 PROCEDURE — G0378 HOSPITAL OBSERVATION PER HR: HCPCS

## 2019-05-01 PROCEDURE — 93017 CV STRESS TEST TRACING ONLY: CPT

## 2019-05-01 PROCEDURE — 6360000002 HC RX W HCPCS: Performed by: PHYSICIAN ASSISTANT

## 2019-05-01 PROCEDURE — 93005 ELECTROCARDIOGRAM TRACING: CPT | Performed by: PHYSICIAN ASSISTANT

## 2019-05-01 PROCEDURE — A9502 TC99M TETROFOSMIN: HCPCS | Performed by: INTERNAL MEDICINE

## 2019-05-01 PROCEDURE — 36415 COLL VENOUS BLD VENIPUNCTURE: CPT

## 2019-05-01 PROCEDURE — 6370000000 HC RX 637 (ALT 250 FOR IP): Performed by: PHYSICIAN ASSISTANT

## 2019-05-01 PROCEDURE — 6360000002 HC RX W HCPCS: Performed by: INTERNAL MEDICINE

## 2019-05-01 PROCEDURE — 99285 EMERGENCY DEPT VISIT HI MDM: CPT

## 2019-05-01 PROCEDURE — 84484 ASSAY OF TROPONIN QUANT: CPT

## 2019-05-01 PROCEDURE — 71046 X-RAY EXAM CHEST 2 VIEWS: CPT

## 2019-05-01 PROCEDURE — 2580000003 HC RX 258: Performed by: INTERNAL MEDICINE

## 2019-05-01 PROCEDURE — 94760 N-INVAS EAR/PLS OXIMETRY 1: CPT

## 2019-05-01 PROCEDURE — 6370000000 HC RX 637 (ALT 250 FOR IP): Performed by: INTERNAL MEDICINE

## 2019-05-01 PROCEDURE — 93010 ELECTROCARDIOGRAM REPORT: CPT | Performed by: INTERNAL MEDICINE

## 2019-05-01 PROCEDURE — 78452 HT MUSCLE IMAGE SPECT MULT: CPT

## 2019-05-01 PROCEDURE — 96372 THER/PROPH/DIAG INJ SC/IM: CPT

## 2019-05-01 PROCEDURE — 3430000000 HC RX DIAGNOSTIC RADIOPHARMACEUTICAL: Performed by: INTERNAL MEDICINE

## 2019-05-01 PROCEDURE — 96374 THER/PROPH/DIAG INJ IV PUSH: CPT

## 2019-05-01 PROCEDURE — 96376 TX/PRO/DX INJ SAME DRUG ADON: CPT

## 2019-05-01 PROCEDURE — 85025 COMPLETE CBC W/AUTO DIFF WBC: CPT

## 2019-05-01 PROCEDURE — 93306 TTE W/DOPPLER COMPLETE: CPT

## 2019-05-01 PROCEDURE — 83880 ASSAY OF NATRIURETIC PEPTIDE: CPT

## 2019-05-01 RX ORDER — ACETAMINOPHEN 325 MG/1
650 TABLET ORAL EVERY 4 HOURS PRN
Status: DISCONTINUED | OUTPATIENT
Start: 2019-05-01 | End: 2019-05-03 | Stop reason: HOSPADM

## 2019-05-01 RX ORDER — ASPIRIN 81 MG/1
81 TABLET, CHEWABLE ORAL DAILY
Status: DISCONTINUED | OUTPATIENT
Start: 2019-05-01 | End: 2019-05-03 | Stop reason: HOSPADM

## 2019-05-01 RX ORDER — NICOTINE 21 MG/24HR
1 PATCH, TRANSDERMAL 24 HOURS TRANSDERMAL DAILY
Status: DISCONTINUED | OUTPATIENT
Start: 2019-05-01 | End: 2019-05-03 | Stop reason: HOSPADM

## 2019-05-01 RX ORDER — ALBUTEROL SULFATE 90 UG/1
2 AEROSOL, METERED RESPIRATORY (INHALATION) EVERY 6 HOURS PRN
Status: DISCONTINUED | OUTPATIENT
Start: 2019-05-01 | End: 2019-05-03 | Stop reason: HOSPADM

## 2019-05-01 RX ORDER — ATORVASTATIN CALCIUM 10 MG/1
10 TABLET, FILM COATED ORAL DAILY
Status: DISCONTINUED | OUTPATIENT
Start: 2019-05-01 | End: 2019-05-03 | Stop reason: HOSPADM

## 2019-05-01 RX ORDER — LOSARTAN POTASSIUM AND HYDROCHLOROTHIAZIDE 12.5; 5 MG/1; MG/1
2 TABLET ORAL DAILY
Status: DISCONTINUED | OUTPATIENT
Start: 2019-05-01 | End: 2019-05-03 | Stop reason: HOSPADM

## 2019-05-01 RX ORDER — METOPROLOL SUCCINATE 50 MG/1
50 TABLET, EXTENDED RELEASE ORAL DAILY
Status: DISCONTINUED | OUTPATIENT
Start: 2019-05-01 | End: 2019-05-03 | Stop reason: HOSPADM

## 2019-05-01 RX ORDER — FUROSEMIDE 10 MG/ML
40 INJECTION INTRAMUSCULAR; INTRAVENOUS ONCE
Status: COMPLETED | OUTPATIENT
Start: 2019-05-01 | End: 2019-05-01

## 2019-05-01 RX ORDER — ASPIRIN 81 MG/1
324 TABLET, CHEWABLE ORAL ONCE
Status: COMPLETED | OUTPATIENT
Start: 2019-05-01 | End: 2019-05-01

## 2019-05-01 RX ORDER — FUROSEMIDE 10 MG/ML
40 INJECTION INTRAMUSCULAR; INTRAVENOUS 2 TIMES DAILY
Status: DISCONTINUED | OUTPATIENT
Start: 2019-05-02 | End: 2019-05-03 | Stop reason: HOSPADM

## 2019-05-01 RX ORDER — FUROSEMIDE 10 MG/ML
40 INJECTION INTRAMUSCULAR; INTRAVENOUS DAILY
Status: DISCONTINUED | OUTPATIENT
Start: 2019-05-01 | End: 2019-05-01

## 2019-05-01 RX ORDER — ONDANSETRON 2 MG/ML
4 INJECTION INTRAMUSCULAR; INTRAVENOUS EVERY 6 HOURS PRN
Status: DISCONTINUED | OUTPATIENT
Start: 2019-05-01 | End: 2019-05-03 | Stop reason: HOSPADM

## 2019-05-01 RX ORDER — SODIUM CHLORIDE 0.9 % (FLUSH) 0.9 %
10 SYRINGE (ML) INJECTION PRN
Status: DISCONTINUED | OUTPATIENT
Start: 2019-05-01 | End: 2019-05-03 | Stop reason: HOSPADM

## 2019-05-01 RX ORDER — SODIUM CHLORIDE 0.9 % (FLUSH) 0.9 %
10 SYRINGE (ML) INJECTION EVERY 12 HOURS SCHEDULED
Status: DISCONTINUED | OUTPATIENT
Start: 2019-05-01 | End: 2019-05-03 | Stop reason: HOSPADM

## 2019-05-01 RX ORDER — POTASSIUM CHLORIDE 1.5 G/1.77G
40 POWDER, FOR SOLUTION ORAL ONCE
Status: COMPLETED | OUTPATIENT
Start: 2019-05-01 | End: 2019-05-01

## 2019-05-01 RX ADMIN — TETROFOSMIN 30 MILLICURIE: 0.23 INJECTION, POWDER, LYOPHILIZED, FOR SOLUTION INTRAVENOUS at 14:15

## 2019-05-01 RX ADMIN — FUROSEMIDE 40 MG: 10 INJECTION, SOLUTION INTRAMUSCULAR; INTRAVENOUS at 03:50

## 2019-05-01 RX ADMIN — FUROSEMIDE 40 MG: 10 INJECTION, SOLUTION INTRAMUSCULAR; INTRAVENOUS at 09:32

## 2019-05-01 RX ADMIN — ATORVASTATIN CALCIUM 10 MG: 10 TABLET, FILM COATED ORAL at 09:32

## 2019-05-01 RX ADMIN — NITROGLYCERIN 0.5 INCH: 20 OINTMENT TOPICAL at 11:59

## 2019-05-01 RX ADMIN — ASPIRIN 81 MG 324 MG: 81 TABLET ORAL at 04:12

## 2019-05-01 RX ADMIN — NITROGLYCERIN 0.5 INCH: 20 OINTMENT TOPICAL at 23:24

## 2019-05-01 RX ADMIN — Medication 10 ML: at 09:34

## 2019-05-01 RX ADMIN — POTASSIUM CHLORIDE 40 MEQ: 1.5 POWDER, FOR SOLUTION ORAL at 07:26

## 2019-05-01 RX ADMIN — ACETAMINOPHEN 650 MG: 325 TABLET ORAL at 07:27

## 2019-05-01 RX ADMIN — NITROGLYCERIN 0.5 INCH: 20 OINTMENT TOPICAL at 07:27

## 2019-05-01 RX ADMIN — ASPIRIN 81 MG 81 MG: 81 TABLET ORAL at 09:32

## 2019-05-01 RX ADMIN — NITROGLYCERIN 1 INCH: 20 OINTMENT TOPICAL at 04:13

## 2019-05-01 RX ADMIN — ENOXAPARIN SODIUM 40 MG: 40 INJECTION SUBCUTANEOUS at 09:32

## 2019-05-01 RX ADMIN — LOSARTAN POTASSIUM AND HYDROCHLOROTHIAZIDE 2 TABLET: 50; 12.5 TABLET, FILM COATED ORAL at 09:32

## 2019-05-01 RX ADMIN — Medication 10 ML: at 23:28

## 2019-05-01 RX ADMIN — NITROGLYCERIN 0.5 INCH: 20 OINTMENT TOPICAL at 18:06

## 2019-05-01 RX ADMIN — REGADENOSON 0.4 MG: 0.08 INJECTION, SOLUTION INTRAVENOUS at 14:26

## 2019-05-01 RX ADMIN — ACETAMINOPHEN 650 MG: 325 TABLET ORAL at 11:59

## 2019-05-01 ASSESSMENT — PAIN DESCRIPTION - ONSET
ONSET: ON-GOING

## 2019-05-01 ASSESSMENT — PAIN DESCRIPTION - PROGRESSION
CLINICAL_PROGRESSION: NOT CHANGED

## 2019-05-01 ASSESSMENT — ENCOUNTER SYMPTOMS
SHORTNESS OF BREATH: 1
ABDOMINAL PAIN: 0
NAUSEA: 1
VOMITING: 0

## 2019-05-01 ASSESSMENT — PAIN SCALES - GENERAL
PAINLEVEL_OUTOF10: 5
PAINLEVEL_OUTOF10: 0
PAINLEVEL_OUTOF10: 0
PAINLEVEL_OUTOF10: 3
PAINLEVEL_OUTOF10: 7
PAINLEVEL_OUTOF10: 2
PAINLEVEL_OUTOF10: 2
PAINLEVEL_OUTOF10: 4
PAINLEVEL_OUTOF10: 6

## 2019-05-01 ASSESSMENT — PAIN DESCRIPTION - LOCATION
LOCATION: HEAD
LOCATION: HEAD
LOCATION: CHEST

## 2019-05-01 ASSESSMENT — PAIN - FUNCTIONAL ASSESSMENT
PAIN_FUNCTIONAL_ASSESSMENT: ACTIVITIES ARE NOT PREVENTED

## 2019-05-01 ASSESSMENT — PAIN DESCRIPTION - FREQUENCY
FREQUENCY: CONTINUOUS
FREQUENCY: INTERMITTENT
FREQUENCY: INTERMITTENT

## 2019-05-01 ASSESSMENT — PAIN DESCRIPTION - ORIENTATION
ORIENTATION: LEFT
ORIENTATION: MID
ORIENTATION: ANTERIOR

## 2019-05-01 ASSESSMENT — PAIN DESCRIPTION - DESCRIPTORS
DESCRIPTORS: ACHING;DULL
DESCRIPTORS: HEADACHE
DESCRIPTORS: ACHING

## 2019-05-01 ASSESSMENT — PAIN DESCRIPTION - PAIN TYPE
TYPE: ACUTE PAIN

## 2019-05-01 ASSESSMENT — HEART SCORE: ECG: 1

## 2019-05-01 NOTE — PROGRESS NOTES
Patient admitted to room 4113 from ER via stretcher. Oriented to room, call light, and floor policies. Plan of care reviewed with patient/family. Tele in place reading sinus rhythm a rate of 73. Safety precautions in place; call light and bedside table within reach. Pt encouraged to call for needs or ambulation. Pt VU. Will continue to monitor.

## 2019-05-01 NOTE — PROGRESS NOTES
4 Eyes Skin Assessment     The patient is being assess for  Admission    I agree that 2 RN's have performed a thorough Head to Toe Skin Assessment on the patient. ALL assessment sites listed below have been assessed. Areas assessed by both nurses:   [x]   Head, Face, and Ears   [x]   Shoulders, Back, and Chest  [x]   Arms, Elbows, and Hands   [x]   Coccyx, Sacrum, and IschIum  [x]   Legs, Feet, and Heels        Does the Patient have Skin Breakdown?   No         Nathaniel Prevention initiated:  No   Wound Care Orders initiated:  No      [facility] nurse consulted for Pressure Injury (Stage 3,4, Unstageable, DTI, NWPT, and Complex wounds), New and Established Ostomies:  No      Nurse 1 eSignature: Electronically signed by Celina Sheldon RN on 5/1/19 at 6:45 PM    **SHARE this note so that the co-signing nurse is able to place an eSignature**    Nurse 2 eSignature: Electronically signed by Jalen Moore RN on 5/2/19 at 2:53 AM

## 2019-05-01 NOTE — FLOWSHEET NOTE
AD documents, explanatory information and contact information left with patient, who will contact us if further questions or if they wish to complete. 05/01/19 1213   Encounter Summary   Services provided to: Patient   Referral/Consult From: Nurse   Continue Visiting   (pt asleep. Left AD info.   5/1)   Complexity of Encounter Low   Length of Encounter 15 minutes   Advance Directives (For Healthcare)   Healthcare Directive No, patient does not have an advance directive for healthcare treatment   Advance Directives Documents given   [address]  Electronically signed by Effie Young on 5/1/2019 at 12:14 PM

## 2019-05-01 NOTE — PROGRESS NOTES
Patient alert and oriented x4, VSS, laying in bed resting comfortably. Pt denies n/v, diarrhea, SOB, pain, states does have a headache r/t nitro paste. Pt states no needs at this time. Bed in lowest position, non-slip socks on. Pt UAL and tolerating well. Call light within reach. Will continue to monitor pt needs.

## 2019-05-01 NOTE — ED PROVIDER NOTES
[facility]  eMERGENCY dEPARTMENT eNCOUnter      Pt Name: Jill Faye  MRN: [id number]  [place] 1970  Date of evaluation: 5/1/2019  Provider: ALLI Miller    CHIEF COMPLAINT       Chief Complaint   Patient presents with    Chest Pain     Patient states that she has been having intermittent chest tightness at a 6/10 for the last \"few days. \" Also accompanied by SOB, stating that the SOB began a few days before the chest tightness did.  Shortness of Breath         HISTORY OF PRESENT ILLNESS  (Location/Symptom, Timing/Onset, Context/Setting, Quality, Duration, Modifying Factors, Severity.)   Jill Faye is a 51 y.o. female who presents to the emergency department shortness of breath and chest pain. Patient reports chest pain that is left-sided and constant for the past few days. Tightness. Rated 6-7/10. Does radiate into her shoulder. No alleviating or aggravating factors. Also reports shortness of breath for the past few weeks. She reports nausea but denies vomiting abdominal pain fevers or chills. Does have CHF. She reports she was on spirnolactone 25 and Lasix 40. Reports she was taken off of the spironolactone due to hair loss. She also stopped the Lasix but wasn't sure if she was supposed to but then also reports she ran out of her prescription (?). She denies personal history of MI, CAD. She does have history of hypertension diabetes hyperlipidemia smokes half pack a day for 25-30 years has a positive family history of MI.        Nursing Notes were reviewed and I agree. REVIEW OF SYSTEMS    (2-9 systems for level 4, 10 or more for level 5)     Review of Systems   Constitutional: Negative for chills and fever. Respiratory: Positive for shortness of breath. Cardiovascular: Positive for chest pain. Gastrointestinal: Positive for nausea. Negative for abdominal pain and vomiting.      Except as noted above the remainder of the review of systems was reviewed and negative. PAST MEDICAL HISTORY         Diagnosis Date    CHF (congestive heart failure) (HCC)     Diabetes mellitus ([facility] [address]     Hypertension     Hypokalemia     Nicotine addiction     LAURA (obstructive sleep apnea)        SURGICAL HISTORY           Procedure Laterality Date    CARPAL TUNNEL RELEASE         CURRENT MEDICATIONS       Previous Medications    ALBUTEROL SULFATE HFA (PROAIR HFA) 108 (90 BASE) MCG/ACT INHALER    1-2 puffs every 4 hours while awake for 7-10 days then PRN wheezing  Dispense with SPACER and Instruct on use. May sub Ventolin or Proventil as needed per [employer]. ASPIRIN 81 MG CHEWABLE TABLET    Take 1 tablet by mouth daily    ATORVASTATIN (LIPITOR) 10 MG TABLET    Take 1 tablet by mouth daily    FUROSEMIDE (LASIX) 40 MG TABLET    Take 1 tablet by mouth daily    IBUPROFEN (ADVIL;MOTRIN) 800 MG TABLET    Take 1 tablet by mouth every 8 hours as needed for Pain    LOSARTAN-HYDROCHLOROTHIAZIDE (HYZAAR) 100-25 MG PER TABLET    Take 1 tablet by mouth daily    METFORMIN (GLUCOPHAGE) 500 MG TABLET    Take 500 mg by mouth 2 times daily (with meals)     METOPROLOL SUCCINATE (TOPROL XL) 50 MG EXTENDED RELEASE TABLET    Take 1 tablet by mouth daily    POTASSIUM PO    Take by mouth as needed Powder    SPIRONOLACTONE (ALDACTONE) 25 MG TABLET    Take 1 tablet by mouth daily       ALLERGIES     Patient has no known allergies. FAMILY HISTORY     History reviewed. No pertinent family history. No family status information on file. SOCIAL HISTORY      reports that she has been smoking cigarettes. She has been smoking about 0.50 packs per day. She has never used smokeless tobacco. She reports that she drinks alcohol. She reports that she does not use drugs.     PHYSICAL EXAM    (up to 7 for level 4, 8 or more for level 5)     ED Triage Vitals [05/01/19 0112]   BP Temp Temp Source Pulse Resp SpO2 Height Weight   (!) 152/92 97.5 °F (36.4 °C) Oral 87 18 95 % 5' 4\" (1.626 m) 216 lb 0.8 oz (98 kg)       Physical Exam   Constitutional: She is oriented to person, place, and time. She appears well-developed and well-nourished. No distress. HENT:   Head: Normocephalic and atraumatic. Nose: Nose normal.   Eyes: EOM are normal.   Neck: Normal range of motion. Neck supple. Cardiovascular: Normal rate, regular rhythm and normal heart sounds. Pulmonary/Chest: Effort normal and breath sounds normal. No respiratory distress. Abdominal: Soft. She exhibits no distension and no mass. There is no tenderness. There is no rebound and no guarding. No hernia. Musculoskeletal: Normal range of motion. She exhibits no edema (no lower extremity edema bilaterlaly) or tenderness (no calf tenderness bilaterally). Neurological: She is alert and oriented to person, place, and time. Skin: Skin is warm and dry. She is not diaphoretic. Psychiatric: She has a normal mood and affect. Her behavior is normal. Judgment and thought content normal.       DIFFERENTIAL DIAGNOSIS   Acute Myocardial Infarction, Acute Coronary Syndrome, Pneumothorax, Aortic Dissection, Pulmonary Embolism, Pneumonia      DIAGNOSTICRESULTS     EKG obtained. See Dr. Anayeli oMore note for interpretation.   RADIOLOGY:   Non-plain film images such as CT, Ultrasound and MRI are read by the radiologist. Plain radiographic images are visualized and preliminarily interpreted by ALLI Hallman with the below findings:      Interpretation per the Radiologist below, if available at the time of this note:    XR CHEST STANDARD (2 VW)   Final Result   Suspect mild pulmonary edema, increased compared to prior               LABS:  Results for orders placed or performed during the hospital encounter of 05/01/19   Brain Natriuretic Peptide   Result Value Ref Range    Pro-BNP 3,414 (H) 0 - 124 pg/mL   CBC Auto Differential   Result Value Ref Range    WBC 10.5 4.0 - 11.0 K/uL    RBC 4.88 4.00 - 5.20 M/uL    Hemoglobin 12.5 12.0 - 16.0 g/dL    Hematocrit 37.6 36.0 - 48.0 %    MCV 77.1 (L) 80.0 - 100.0 fL    MCH 25.6 (L) 26.0 - 34.0 pg    MCHC 33.2 31.0 - 36.0 g/dL    RDW 15.1 12.4 - 15.4 %    Platelets 057 391 - 357 K/uL    MPV 9.6 5.0 - 10.5 fL    Neutrophils % 60.5 %    Lymphocytes % 27.1 %    Monocytes % 8.2 %    Eosinophils % 3.3 %    Basophils % 0.9 %    Neutrophils # 6.4 1.7 - 7.7 K/uL    Lymphocytes # 2.9 1.0 - 5.1 K/uL    Monocytes # 0.9 0.0 - 1.3 K/uL    Eosinophils # 0.3 0.0 - 0.6 K/uL    Basophils # 0.1 0.0 - 0.2 K/uL   Comprehensive Metabolic Panel   Result Value Ref Range    Sodium 142 136 - 145 mmol/L    Potassium 3.4 (L) 3.5 - 5.1 mmol/L    Chloride 103 99 - 110 mmol/L    CO2 29 21 - 32 mmol/L    Anion Gap 10 3 - 16    Glucose 155 (H) 70 - 99 mg/dL    BUN 13 7 - 20 mg/dL    CREATININE 1.0 0.6 - 1.1 mg/dL    GFR Non- 59 (A) >60    GFR African American >60 >60    Calcium 8.8 8.3 - 10.6 mg/dL    Total Protein 6.3 (L) 6.4 - 8.2 g/dL    Alb 3.5 3.4 - 5.0 g/dL    Albumin/Globulin Ratio 1.3 1.1 - 2.2    Total Bilirubin 0.3 0.0 - 1.0 mg/dL    Alkaline Phosphatase 114 40 - 129 U/L    ALT 55 (H) 10 - 40 U/L    AST 59 (H) 15 - 37 U/L    Globulin 2.8 g/dL   Troponin   Result Value Ref Range    Troponin 0.04 (H) <0.01 ng/mL       All other labs were withinnormal range or not returned as of this dictation. EMERGENCY DEPARTMENT COURSE and DIFFERENTIAL DIAGNOSIS/MDM:   Vitals:    Vitals:    05/01/19 0112 05/01/19 0149   BP: (!) 152/92 (!) 181/103   Pulse: 87 88   Resp: 18 18   Temp: 97.5 °F (36.4 °C) 99 °F (37.2 °C)   TempSrc: Oral Oral   SpO2: 95% 96%   Weight: 216 lb 0.8 oz (98 kg) 226 lb 6.6 oz (102.7 kg)   Height: 5' 4\" (1.626 m) 5' 4\" (1.626 m)       Patient was seen and examined by myself and Dr. Emery Bullard . Patient was nontoxic, well appearing, afebrile with normal vital signs with the exception of HTN. Chest pain has been constant for few days. See Dr. Gricel Lobo note for EKG interpretation.   CBC CMP normal.  BNP 98206.  Trop 0.04. CXr with edema. Was given lasix 40 mg IV and ASA. Believe she requires admission for CHF exacerbation. I consulted medicine and patient was accepted for admission. Upon reevaluation patient is sitting in bed in no distress. She is in agreement with plan for admission. This patient was seen by the attendingphysician and they agreed with the assessment and plan. CONSULTS:  None    PROCEDURES:  None    FINAL IMPRESSION      1. Acute on chronic congestive heart failure, unspecified heart failure type ([facility] [address]    2. Elevated troponin          DISPOSITION/PLAN   DISPOSITION Decision To Admit 05/01/2019 03:42:42 AM      PATIENT REFERRED TO:  No follow-up provider specified.     DISCHARGE MEDICATIONS:  New Prescriptions    No medications on file       (Please note that portions of this note werecompleted with a voice recognition program.  Efforts were made to edit the dictations but occasionally words are mis-transcribed.)    Jeramy Rodriguez, [address] [place] [address], [address] [place] [address]  05/01/19 [id number]

## 2019-05-01 NOTE — PROGRESS NOTES
Hospitalist  History and Physical    Patient:  Norman العلي  MRN: [id number]  PCP: Sharyn ALVAREZ    CHIEF COMPLAINT:  Chest Pain,,Shortness of Breath      HISTORY OF PRESENT ILLNESS:   The patient Norman العلي is a 51 y. o.female with medical history significant for hypertension and diabetes mellitus CHF. Patient presented to the emergency room with chest pain and shortness of breath. Patient reported left-sided chest pain that has been going on for 1 week patient reports her intensity of the pain as 7/10 nature of the pain is tightness. Pain radiates to the left shoulder. Patient also reports shortness of breath that has been worsening over the last few days. Patient reports that she has been on Lasix and spironolactone. Spironolactone was discontinued. Patient ran out of her Lasix and has not referral date. Past Medical History:        Diagnosis Date    CHF (congestive heart failure) (HCC)     Diabetes mellitus ([facility] [address]     Hypertension     Hypokalemia     Nicotine addiction     LAURA (obstructive sleep apnea)        Past Surgical History:        Procedure Laterality Date    CARPAL TUNNEL RELEASE         Medications Prior to Admission:    Prior to Admission medications    Medication Sig Start Date End Date Taking? Authorizing Provider   albuterol sulfate HFA (PROAIR HFA) 108 (90 Base) MCG/ACT inhaler 1-2 puffs every 4 hours while awake for 7-10 days then PRN wheezing  Dispense with SPACER and Instruct on use. May sub Ventolin or Proventil as needed per [employer].  11/25/18   Esme Urbina APRN - CNP   ibuprofen (ADVIL;MOTRIN) 800 MG tablet Take 1 tablet by mouth every 8 hours as needed for Pain 11/25/18   ALBA Acosta - CNP   POTASSIUM PO Take by mouth as needed Powder    Historical Provider, MD   furosemide (LASIX) 40 MG tablet Take 1 tablet by mouth daily 10/18/18   Lucinda Kinsey MD   losartan-hydrochlorothiazide (HYZAAR) 100-25 MG per tablet Take 1 tablet by mouth daily menopause    MUSCULOSKELETAL:       joint swelling or stiffness, joint pain, muscle pain, balance problems, low back pain. NEUROLOGICAL:      Gait problems. Tremor. Dizziness. Pain and paresthesias, weakness in extremities. Seizures, memory loss    PSYCHLOGICAL:        Anxiety, depression    SKIN :      Rashes ulcers, skin color changes, easy bruisability, lymphadenopathy      Physical Exam:      Vitals: BP (!) 165/93   Pulse 92   Temp 97.9 °F (36.6 °C) (Oral)   Resp 20   Ht 5' 4\" (1.626 m)   Wt 211 lb 6.7 oz (95.9 kg)   LMP 05/01/2018   SpO2 95%   BMI 36.29 kg/m²     Gen:          Alert and oriented x 3  Eyes: PERRL. No sclera icterus. No conjunctival injection. ENT: No discharge. Pharynx clear. External appearance of ears and nose normal.  Neck: Trachea midline. No obvious mass. Resp: decreased breath sounds bilaterally  CV: Regular rate. Regular rhythm. No murmur or rub. No edema. GI: Non-tender. Non-distended. No hernia. Skin: Warm, dry, normal texture and turgor. Lymph: No cervical LAD. No supraclavicular LAD. M/S: / Ext. No cyanosis. No clubbing. No joint deformity. Neuro: Moves all four extremities. CN 2-12 tested, no deficits noted. Peripheral pulses and capillary refill is intact. CBC:   Recent Labs     05/01/19 0234   WBC 10.5   HGB 12.5        BMP:    Recent Labs     05/01/19 0234      K 3.4*      CO2 29   BUN 13   CREATININE 1.0   GLUCOSE 155*     Hepatic:   Recent Labs     05/01/19 0234   AST 59*   ALT 55*   BILITOT 0.3   ALKPHOS 114     Troponin:   Recent Labs     05/01/19 0234 05/01/19  0752   TROPONINI 0.04* 0.04*     BNP: No results for input(s): BNP in the last 72 hours. INR: No results for input(s): INR in the last 72 hours. Lab Results   Component Value Date    LABA1C 8.1 12/18/2017           No results for input(s): CKTOTAL in the last 72 hours.   -----------------------------------------------------------------  XR CHEST STANDARD  Suspect mild pulmonary edema, increased compared to prior    EKG  Normal sinus rhythmBiatrial        Assessment / Plan     Acute on chronic combined systolic diastolic CHF  Start patient on IV Lasix  Strict intake output maintenance  Low-salt diet  Consult CHF nurse  Daily weight  Echocardiogram in a.m. Essential primary hypertension I10  Continue patient on home medication    Tobacco abuse Z72.0  Nicotine patch and counseling    Chest pain  Rule out for acute coronary syndrome  Stress test results are pending      DVT and GI prophylaxis      Full Code      Dolly Mathis M.D    This note was transcribed using [id number] [employer]. Please disregard any translational errors.

## 2019-05-01 NOTE — PLAN OF CARE
Problem: Pain:  Goal: Pain level will decrease  Description  Pain level will decrease  Outcome: Ongoing  Goal: Control of acute pain  Description  Control of acute pain  Outcome: Ongoing  Goal: Control of chronic pain  Description  Control of chronic pain  Outcome: Ongoing     Problem: Falls - Risk of:  Goal: Will remain free from falls  Description  Will remain free from falls  Outcome: Ongoing  Goal: Absence of physical injury  Description  Absence of physical injury  Outcome: Ongoing     Problem: OXYGENATION/RESPIRATORY FUNCTION  Goal: Patient will maintain patent airway  Outcome: Ongoing  Goal: Patient will achieve/maintain normal respiratory rate/effort  Description  Respiratory rate and effort will be within normal limits for the patient  Outcome: Ongoing     Problem: HEMODYNAMIC STATUS  Goal: Patient has stable vital signs and fluid balance  Outcome: Ongoing     Problem: FLUID AND ELECTROLYTE IMBALANCE  Goal: Fluid and electrolyte balance are achieved/maintained  Outcome: Ongoing     Problem: ACTIVITY INTOLERANCE/IMPAIRED MOBILITY  Goal: Mobility/activity is maintained at optimum level for patient  Outcome: Ongoing

## 2019-05-01 NOTE — ED TRIAGE NOTES
Patient to ED with c/o chest pain and sob. States that she has been having intermittent chest tightness at a 6/10 for the last \"few days. \" Also accompanied by SOB, stating that the SOB began a few days before the chest tightness did.

## 2019-05-01 NOTE — CARE COORDINATION
Stress test and stress myoview images are complete. Pt will have resting images in the a.m. for comparison. There is no prep for this portion of the study. We will try to coordinate the echo at the same time.

## 2019-05-02 LAB
ANION GAP SERPL CALCULATED.3IONS-SCNC: 13 MMOL/L (ref 3–16)
BASOPHILS ABSOLUTE: 0.1 K/UL (ref 0–0.2)
BASOPHILS RELATIVE PERCENT: 0.7 %
BUN BLDV-MCNC: 13 MG/DL (ref 7–20)
CALCIUM SERPL-MCNC: 8.8 MG/DL (ref 8.3–10.6)
CHLORIDE BLD-SCNC: 98 MMOL/L (ref 99–110)
CHOLESTEROL, TOTAL: 142 MG/DL (ref 0–199)
CO2: 31 MMOL/L (ref 21–32)
CREAT SERPL-MCNC: 0.9 MG/DL (ref 0.6–1.1)
EKG ATRIAL RATE: 88 BPM
EKG DIAGNOSIS: NORMAL
EKG P AXIS: 70 DEGREES
EKG P-R INTERVAL: 172 MS
EKG Q-T INTERVAL: 394 MS
EKG QRS DURATION: 86 MS
EKG QTC CALCULATION (BAZETT): 476 MS
EKG R AXIS: 64 DEGREES
EKG T AXIS: 118 DEGREES
EKG VENTRICULAR RATE: 88 BPM
EOSINOPHILS ABSOLUTE: 0.4 K/UL (ref 0–0.6)
EOSINOPHILS RELATIVE PERCENT: 5.1 %
GFR AFRICAN AMERICAN: >60
GFR NON-AFRICAN AMERICAN: >60
GLUCOSE BLD-MCNC: 168 MG/DL (ref 70–99)
HCT VFR BLD CALC: 40.8 % (ref 36–48)
HDLC SERPL-MCNC: 34 MG/DL (ref 40–60)
HEMOGLOBIN: 13.4 G/DL (ref 12–16)
LDL CHOLESTEROL CALCULATED: 81 MG/DL
LYMPHOCYTES ABSOLUTE: 2.3 K/UL (ref 1–5.1)
LYMPHOCYTES RELATIVE PERCENT: 28.9 %
MAGNESIUM: 2.1 MG/DL (ref 1.8–2.4)
MCH RBC QN AUTO: 25.5 PG (ref 26–34)
MCHC RBC AUTO-ENTMCNC: 32.9 G/DL (ref 31–36)
MCV RBC AUTO: 77.5 FL (ref 80–100)
MONOCYTES ABSOLUTE: 0.9 K/UL (ref 0–1.3)
MONOCYTES RELATIVE PERCENT: 11.1 %
NEUTROPHILS ABSOLUTE: 4.4 K/UL (ref 1.7–7.7)
NEUTROPHILS RELATIVE PERCENT: 54.2 %
PDW BLD-RTO: 14.9 % (ref 12.4–15.4)
PLATELET # BLD: 295 K/UL (ref 135–450)
PMV BLD AUTO: 9.8 FL (ref 5–10.5)
POTASSIUM SERPL-SCNC: 3.2 MMOL/L (ref 3.5–5.1)
RBC # BLD: 5.27 M/UL (ref 4–5.2)
SODIUM BLD-SCNC: 142 MMOL/L (ref 136–145)
TRIGL SERPL-MCNC: 137 MG/DL (ref 0–150)
VLDLC SERPL CALC-MCNC: 27 MG/DL
WBC # BLD: 8 K/UL (ref 4–11)

## 2019-05-02 PROCEDURE — G0378 HOSPITAL OBSERVATION PER HR: HCPCS

## 2019-05-02 PROCEDURE — 83735 ASSAY OF MAGNESIUM: CPT

## 2019-05-02 PROCEDURE — 2580000003 HC RX 258: Performed by: INTERNAL MEDICINE

## 2019-05-02 PROCEDURE — 80048 BASIC METABOLIC PNL TOTAL CA: CPT

## 2019-05-02 PROCEDURE — A9502 TC99M TETROFOSMIN: HCPCS | Performed by: INTERNAL MEDICINE

## 2019-05-02 PROCEDURE — 6360000002 HC RX W HCPCS: Performed by: INTERNAL MEDICINE

## 2019-05-02 PROCEDURE — 3430000000 HC RX DIAGNOSTIC RADIOPHARMACEUTICAL: Performed by: INTERNAL MEDICINE

## 2019-05-02 PROCEDURE — 1200000000 HC SEMI PRIVATE

## 2019-05-02 PROCEDURE — 96372 THER/PROPH/DIAG INJ SC/IM: CPT

## 2019-05-02 PROCEDURE — 6370000000 HC RX 637 (ALT 250 FOR IP): Performed by: INTERNAL MEDICINE

## 2019-05-02 PROCEDURE — 85025 COMPLETE CBC W/AUTO DIFF WBC: CPT

## 2019-05-02 PROCEDURE — 80061 LIPID PANEL: CPT

## 2019-05-02 PROCEDURE — 6360000002 HC RX W HCPCS: Performed by: HOSPITALIST

## 2019-05-02 PROCEDURE — 96376 TX/PRO/DX INJ SAME DRUG ADON: CPT

## 2019-05-02 PROCEDURE — 36415 COLL VENOUS BLD VENIPUNCTURE: CPT

## 2019-05-02 RX ADMIN — LOSARTAN POTASSIUM AND HYDROCHLOROTHIAZIDE 2 TABLET: 50; 12.5 TABLET, FILM COATED ORAL at 08:57

## 2019-05-02 RX ADMIN — TETROFOSMIN 30 MILLICURIE: 0.23 INJECTION, POWDER, LYOPHILIZED, FOR SOLUTION INTRAVENOUS at 07:08

## 2019-05-02 RX ADMIN — FUROSEMIDE 40 MG: 10 INJECTION, SOLUTION INTRAMUSCULAR; INTRAVENOUS at 17:19

## 2019-05-02 RX ADMIN — Medication 10 ML: at 08:57

## 2019-05-02 RX ADMIN — METOPROLOL SUCCINATE 50 MG: 50 TABLET, EXTENDED RELEASE ORAL at 08:57

## 2019-05-02 RX ADMIN — ASPIRIN 81 MG 81 MG: 81 TABLET ORAL at 08:57

## 2019-05-02 RX ADMIN — NITROGLYCERIN 0.5 INCH: 20 OINTMENT TOPICAL at 17:20

## 2019-05-02 RX ADMIN — NITROGLYCERIN 0.5 INCH: 20 OINTMENT TOPICAL at 23:59

## 2019-05-02 RX ADMIN — FUROSEMIDE 40 MG: 10 INJECTION, SOLUTION INTRAMUSCULAR; INTRAVENOUS at 08:57

## 2019-05-02 RX ADMIN — ATORVASTATIN CALCIUM 10 MG: 10 TABLET, FILM COATED ORAL at 08:57

## 2019-05-02 RX ADMIN — NITROGLYCERIN 0.5 INCH: 20 OINTMENT TOPICAL at 12:51

## 2019-05-02 RX ADMIN — ENOXAPARIN SODIUM 40 MG: 40 INJECTION SUBCUTANEOUS at 08:55

## 2019-05-02 RX ADMIN — NITROGLYCERIN 0.5 INCH: 20 OINTMENT TOPICAL at 06:42

## 2019-05-02 RX ADMIN — Medication 10 ML: at 23:59

## 2019-05-02 NOTE — PROGRESS NOTES
Spoke w/ [facility] about pt's stress test getting read and they stated that they are working on getting it done.

## 2019-05-02 NOTE — PROGRESS NOTES
Hospitalist Progress Note  5/2/2019 11:03 AM    PCP: Stoney Coy    [id number]     Date of Admission: 5/1/2019                                                                                                                     HOSPITAL COURSE    Patient demographics:  The patient  Rosemarie Porter is a 51 y.o. female     Significant past medical history:   Patient Active Problem List   Diagnosis    [facility]    Atypical chest pain    Essential hypertension    Type 2 diabetes mellitus with hyperglycemia, without long-term current use of insulin (HCC)    Acute decompensated heart failure ([facility]    LVH (left ventricular hypertrophy) due to hypertensive disease, with heart failure (HCC)    Cigarette nicotine dependence without complication    Chest pain         Presenting symptoms:      Diagnostic workup:      CONSULTS DURING ADMISSION :   IP CONSULT TO HEART FAILURE NURSE/COORDINATOR  IP CONSULT TO DIETITIAN  IP CONSULT TO SPIRITUAL SERVICES  IP CONSULT TO DIETITIAN  IP CONSULT TO HEART FAILURE NURSE/COORDINATOR      Patient was diagnosed with:        Treatment while inpatient:                                                                                         ----------------------------------------------------------      SUBJECTIVE COMPLAINTS- follow-up for chest pain and shortness of breath    Diet: DIET CARDIAC;      OBJECTIVE:   Patient Active Problem List   Diagnosis    Acute systolic heart failure ([facility]    Atypical chest pain    Essential hypertension    Type 2 diabetes mellitus with hyperglycemia, without long-term current use of insulin (HCC)    Acute decompensated heart failure ([facility]    LVH (left ventricular hypertrophy) due to hypertensive disease, with heart failure (HCC)    Cigarette nicotine dependence without complication    Chest pain       Allergies  Patient has no known allergies.     Medications    Scheduled Meds:   aspirin  81 mg Oral Daily    atorvastatin  10 mg Oral Daily    losartan-hydrochlorothiazide  2 tablet Oral Daily    metoprolol succinate  50 mg Oral Daily    sodium chloride flush  10 mL Intravenous 2 times per day    enoxaparin  40 mg Subcutaneous Daily    nitroglycerin  0.5 inch Topical 4 times per day    nicotine  1 patch Transdermal Daily    furosemide  40 mg Intravenous BID     Continuous Infusions:  PRN Meds:  albuterol sulfate HFA, sodium chloride flush, magnesium hydroxide, ondansetron, acetaminophen    Vitals   Vitals /wt   Patient Vitals for the past 8 hrs:   BP Temp Temp src Pulse Resp SpO2 Weight   05/02/19 1014 (!) 151/81 98.5 °F (36.9 °C) Oral 81 16 93 % --   05/02/19 0414 (!) 184/91 97.6 °F (36.4 °C) Oral 74 16 92 % 211 lb 13.8 oz (96.1 kg)        72HR INTAKE/OUTPUT:      Intake/Output Summary (Last 24 hours) at 5/2/2019 1103  Last data filed at 5/2/2019 1016  Gross per 24 hour   Intake 370 ml   Output 1100 ml   Net -730 ml       Exam:    Gen:   Alert and oriented ×3  Eyes: PERRL. No sclera icterus. No conjunctival injection. ENT: No discharge. Pharynx clear. External appearance of ears and nose normal.  Neck: Trachea midline. No obvious mass. Resp: No accessory muscle use. No crackles. No wheezes. No rhonchi. CV: Regular rate. Regular rhythm. No murmur or rub. No edema. GI: Non-tender. Non-distended. No hernia. Skin: Warm, dry, normal texture and turgor. Lymph: No cervical LAD. No supraclavicular LAD. M/S: / Ext. No cyanosis. No clubbing. No joint deformity. Neuro: CN 2-12 are intact,  no neurologic deficits noted. PT/INR: No results for input(s): PROTIME, INR in the last 72 hours. APTT: No results for input(s): APTT in the last 72 hours.     CBC:   Recent Labs     05/01/19 0234 05/02/19  0625   WBC 10.5 8.0   HGB 12.5 13.4   HCT 37.6 40.8   MCV 77.1* 77.5*    295       BMP:   Recent Labs     05/01/19  0234 05/02/19  0625    142   K 3.4* 3.2*    98*   CO2 29 31   BUN 13 13 CREATININE 1.0 0.9       LIVER PROFILE:   Recent Labs     05/01/19  0234   ALKPHOS 114   AST 59*   ALT 55*   BILITOT 0.3     No results for input(s): AMYLASE in the last 72 hours. No results for input(s): LIPASE in the last 72 hours. UA:No results for input(s): NITRITE, LABCAST, WBCUA, RBCUA, MUCUS in the last 72 hours. TROPONIN:   Recent Labs     05/01/19  0234 05/01/19  0752 05/01/19  1109   TROPONINI 0.04* 0.04* <0.01       Lab Results   Component Value Date/Time    URRFLXCULT Not Indicated 11/25/2018 07:27 PM       No results for input(s): TSHREFLEX in the last 72 hours. No components found for: [id number]  POC GLUCOSE:  No results for input(s): POCGLU in the last 72 hours. No results for input(s): LABA1C in the last 72 hours. Lab Results   Component Value Date    LABA1C 8.1 12/18/2017     echocardiogram  Ejection fraction 50%-WGARJ 2 diastolic dysfunction      ASSESSMENT AND PLAN  Acute on chronic combined systolic diastolic CHF  Start patient on IV Lasix  Strict intake output maintenance  Low-salt diet-1500 mL fluid restriction  Consult CHF nurse  Daily weight  Echocardiogram shows ejection fraction of 40% and grade 2 diastolic dysfunction     Essential primary hypertension I10  Continue patient on home medication     Tobacco abuse Z72.0  Nicotine patch and counseling     Chest pain  Rule out for acute coronary syndrome  Stress test results are pending        DVT and GI prophylaxis           Code Status: Full Code        Dispo - Continue care        The patient and / or the family were informed of the results of any tests, a time was given to answer questions, a plan was proposed and they agreed with plan. Sana Ramirez MD    This note was transcribed using [id number] [employer]. Please disregard any translational errors.

## 2019-05-02 NOTE — PROGRESS NOTES
Pharmacy Note    Home medication reconciliation     Met with pt at bedside to discuss medications. Currently on:  Toprol XL 50 mg daily         ASA 81 mg daily         Atorvastatin 10 mg daily         Metformin 500 mg BID    She reports having a diagnosis of CHF and was on Aldactone, Lasix and Losartan/HCTZ but is not taking any of these at this time. I do not see any fills for them since 10/2018. She did call her physician who said it was okay to stop the Aldactone ( causing her to loose hair) and Losartan ( the 'recall\" last year). Ms Claire's prescription for Lasix ran out and she did not call for a refill. She realizes that she should be taking the \" water pill\" and will be following up with her Cardiologist and PCP about this as she has an appointment with both this month.      Darcy Hurst, [facility]

## 2019-05-02 NOTE — PROGRESS NOTES
Called [facility] to get pt's 2-day stress test read. They will notify MD to get test read. Will continue to monitor.

## 2019-05-03 VITALS
TEMPERATURE: 98.1 F | DIASTOLIC BLOOD PRESSURE: 82 MMHG | OXYGEN SATURATION: 97 % | HEART RATE: 80 BPM | SYSTOLIC BLOOD PRESSURE: 132 MMHG | WEIGHT: 210.54 LBS | BODY MASS INDEX: 35.94 KG/M2 | RESPIRATION RATE: 16 BRPM | HEIGHT: 64 IN

## 2019-05-03 LAB
ANION GAP SERPL CALCULATED.3IONS-SCNC: 12 MMOL/L (ref 3–16)
BUN BLDV-MCNC: 17 MG/DL (ref 7–20)
CALCIUM SERPL-MCNC: 9.3 MG/DL (ref 8.3–10.6)
CHLORIDE BLD-SCNC: 94 MMOL/L (ref 99–110)
CO2: 33 MMOL/L (ref 21–32)
CREAT SERPL-MCNC: 1.1 MG/DL (ref 0.6–1.1)
GFR AFRICAN AMERICAN: >60
GFR NON-AFRICAN AMERICAN: 53
GLUCOSE BLD-MCNC: 137 MG/DL (ref 70–99)
GLUCOSE BLD-MCNC: 203 MG/DL (ref 70–99)
GLUCOSE BLD-MCNC: 205 MG/DL (ref 70–99)
HCT VFR BLD CALC: 42.5 % (ref 36–48)
HEMOGLOBIN: 14.2 G/DL (ref 12–16)
MAGNESIUM: 2.1 MG/DL (ref 1.8–2.4)
MCH RBC QN AUTO: 25.5 PG (ref 26–34)
MCHC RBC AUTO-ENTMCNC: 33.4 G/DL (ref 31–36)
MCV RBC AUTO: 76.2 FL (ref 80–100)
PDW BLD-RTO: 14.8 % (ref 12.4–15.4)
PERFORMED ON: ABNORMAL
PERFORMED ON: ABNORMAL
PLATELET # BLD: 300 K/UL (ref 135–450)
PMV BLD AUTO: 9.6 FL (ref 5–10.5)
POTASSIUM SERPL-SCNC: 3.2 MMOL/L (ref 3.5–5.1)
PRO-BNP: 905 PG/ML (ref 0–124)
RBC # BLD: 5.57 M/UL (ref 4–5.2)
SODIUM BLD-SCNC: 139 MMOL/L (ref 136–145)
WBC # BLD: 8 K/UL (ref 4–11)

## 2019-05-03 PROCEDURE — 94760 N-INVAS EAR/PLS OXIMETRY 1: CPT

## 2019-05-03 PROCEDURE — 80048 BASIC METABOLIC PNL TOTAL CA: CPT

## 2019-05-03 PROCEDURE — 6370000000 HC RX 637 (ALT 250 FOR IP): Performed by: HOSPITALIST

## 2019-05-03 PROCEDURE — 6360000002 HC RX W HCPCS: Performed by: INTERNAL MEDICINE

## 2019-05-03 PROCEDURE — 2580000003 HC RX 258: Performed by: INTERNAL MEDICINE

## 2019-05-03 PROCEDURE — 85027 COMPLETE CBC AUTOMATED: CPT

## 2019-05-03 PROCEDURE — 6360000002 HC RX W HCPCS: Performed by: HOSPITALIST

## 2019-05-03 PROCEDURE — 96376 TX/PRO/DX INJ SAME DRUG ADON: CPT

## 2019-05-03 PROCEDURE — 6370000000 HC RX 637 (ALT 250 FOR IP)

## 2019-05-03 PROCEDURE — 6370000000 HC RX 637 (ALT 250 FOR IP): Performed by: INTERNAL MEDICINE

## 2019-05-03 PROCEDURE — 96372 THER/PROPH/DIAG INJ SC/IM: CPT

## 2019-05-03 PROCEDURE — 36415 COLL VENOUS BLD VENIPUNCTURE: CPT

## 2019-05-03 PROCEDURE — 99255 IP/OBS CONSLTJ NEW/EST HI 80: CPT | Performed by: INTERNAL MEDICINE

## 2019-05-03 PROCEDURE — 83735 ASSAY OF MAGNESIUM: CPT

## 2019-05-03 PROCEDURE — 83880 ASSAY OF NATRIURETIC PEPTIDE: CPT

## 2019-05-03 RX ORDER — POTASSIUM CHLORIDE 20 MEQ/1
40 TABLET, EXTENDED RELEASE ORAL PRN
Status: DISCONTINUED | OUTPATIENT
Start: 2019-05-03 | End: 2019-05-03 | Stop reason: HOSPADM

## 2019-05-03 RX ORDER — SPIRONOLACTONE 25 MG/1
25 TABLET ORAL DAILY
Qty: 30 TABLET | Refills: 3 | Status: SHIPPED | OUTPATIENT
Start: 2019-05-03 | End: 2019-12-12

## 2019-05-03 RX ORDER — DEXTROSE MONOHYDRATE 50 MG/ML
100 INJECTION, SOLUTION INTRAVENOUS PRN
Status: DISCONTINUED | OUTPATIENT
Start: 2019-05-03 | End: 2019-05-03 | Stop reason: HOSPADM

## 2019-05-03 RX ORDER — DOCUSATE SODIUM 100 MG/1
100 CAPSULE, LIQUID FILLED ORAL DAILY
Status: DISCONTINUED | OUTPATIENT
Start: 2019-05-03 | End: 2019-05-03 | Stop reason: HOSPADM

## 2019-05-03 RX ORDER — DOCUSATE SODIUM 100 MG/1
CAPSULE, LIQUID FILLED ORAL
Status: COMPLETED
Start: 2019-05-03 | End: 2019-05-03

## 2019-05-03 RX ORDER — DEXTROSE MONOHYDRATE 25 G/50ML
12.5 INJECTION, SOLUTION INTRAVENOUS PRN
Status: DISCONTINUED | OUTPATIENT
Start: 2019-05-03 | End: 2019-05-03 | Stop reason: HOSPADM

## 2019-05-03 RX ORDER — POTASSIUM CHLORIDE 7.45 MG/ML
10 INJECTION INTRAVENOUS PRN
Status: DISCONTINUED | OUTPATIENT
Start: 2019-05-03 | End: 2019-05-03 | Stop reason: HOSPADM

## 2019-05-03 RX ORDER — POTASSIUM CHLORIDE 1.5 G/1.77G
40 POWDER, FOR SOLUTION ORAL ONCE
Status: COMPLETED | OUTPATIENT
Start: 2019-05-03 | End: 2019-05-03

## 2019-05-03 RX ORDER — NICOTINE POLACRILEX 4 MG
15 LOZENGE BUCCAL PRN
Status: DISCONTINUED | OUTPATIENT
Start: 2019-05-03 | End: 2019-05-03 | Stop reason: HOSPADM

## 2019-05-03 RX ADMIN — FUROSEMIDE 40 MG: 10 INJECTION, SOLUTION INTRAMUSCULAR; INTRAVENOUS at 17:22

## 2019-05-03 RX ADMIN — NITROGLYCERIN 0.5 INCH: 20 OINTMENT TOPICAL at 11:37

## 2019-05-03 RX ADMIN — DOCUSATE SODIUM 100 MG: 100 CAPSULE, LIQUID FILLED ORAL at 11:38

## 2019-05-03 RX ADMIN — METOPROLOL SUCCINATE 50 MG: 50 TABLET, EXTENDED RELEASE ORAL at 08:52

## 2019-05-03 RX ADMIN — Medication 10 ML: at 09:02

## 2019-05-03 RX ADMIN — LOSARTAN POTASSIUM AND HYDROCHLOROTHIAZIDE 2 TABLET: 50; 12.5 TABLET, FILM COATED ORAL at 08:52

## 2019-05-03 RX ADMIN — ASPIRIN 81 MG 81 MG: 81 TABLET ORAL at 08:52

## 2019-05-03 RX ADMIN — FUROSEMIDE 40 MG: 10 INJECTION, SOLUTION INTRAMUSCULAR; INTRAVENOUS at 08:52

## 2019-05-03 RX ADMIN — POTASSIUM CHLORIDE 40 MEQ: 1.5 POWDER, FOR SOLUTION ORAL at 16:01

## 2019-05-03 RX ADMIN — NITROGLYCERIN 0.5 INCH: 20 OINTMENT TOPICAL at 05:25

## 2019-05-03 RX ADMIN — ENOXAPARIN SODIUM 40 MG: 40 INJECTION SUBCUTANEOUS at 08:52

## 2019-05-03 RX ADMIN — INSULIN LISPRO 4 UNITS: 100 INJECTION, SOLUTION INTRAVENOUS; SUBCUTANEOUS at 12:47

## 2019-05-03 RX ADMIN — ATORVASTATIN CALCIUM 10 MG: 10 TABLET, FILM COATED ORAL at 08:52

## 2019-05-03 RX ADMIN — NITROGLYCERIN 0.5 INCH: 20 OINTMENT TOPICAL at 17:22

## 2019-05-03 ASSESSMENT — PAIN SCALES - GENERAL: PAINLEVEL_OUTOF10: 0

## 2019-05-03 ASSESSMENT — PAIN DESCRIPTION - PROGRESSION
CLINICAL_PROGRESSION: NOT CHANGED
CLINICAL_PROGRESSION: NOT CHANGED

## 2019-05-03 NOTE — CARE COORDINATION
HOME INFUSION PHARMACY:  · Name:     [facility]  · Address: [facility].  [facility], . [address]  · Phone:    [phone]  · Fax:        [phone]

## 2019-05-03 NOTE — CONSULTS
[facility], [place]   Cardiology Consultation   Date: 5/3/2019  Admit Date:  5/1/2019  Reason for Consultation: acute on chronic systolic/diastolic CHF, chest discomfort  Consult Requesting Physician: Dm Rascon MD     Chief Complaint   Patient presents with    Chest Pain     Patient states that she has been having intermittent chest tightness at a 6/10 for the last \"few days. \" Also accompanied by SOB, stating that the SOB began a few days before the chest tightness did.  Shortness of Breath     HPI: Erika Hodge is a 51 y.o. female with PMH HTN, DM who presents to [facility] ED with chest tightness and dyspnea. Chest tightness is L sided going on for 1 week, radiating to L shoulder, not relieved with rest. Dyspnea worsening over the last few days. Although she should be on lasix, she has missed her dose because medication ran out. BNP 3400. CXR mild pulmonary edema. EKG did not show any dynamic changes (T wave inversion anterolaterally are chronic). She underwent stress testing 5/1 which showed:   Summary    There is some breast tissue attenuation (artifact) on both stress and rest    images.    There is normal isotope uptake at stress and rest. There is no evidence of    myocardial ischemia or scar. TID of 0.97    Severely dilated LV with midly depressed systolic function.    Left ventricular ejection fraction of 40 %. The inferior, apical and septal    walls are more hypokinetic compared with other segments.    Overall findings represent a intermediate to high risk scan.         She has also been diuresed overnight with near-full resolution of her dyspnea. Past Medical History:   Diagnosis Date    CHF (congestive heart failure) (HCC)     Diabetes mellitus ([address]     Hypertension     Hypokalemia     Nicotine addiction     LAURA (obstructive sleep apnea)         Past Surgical History:   Procedure Laterality Date    CARPAL TUNNEL RELEASE         No Known Allergies    Social History:  Reviewed. reports that she has been smoking cigarettes. She has been smoking about 0.50 packs per day. She has never used smokeless tobacco. She reports that she drinks alcohol. She reports that she does not use drugs. Family History:  Reviewed. family history is not on file. No premature CAD. Review of System:  All other systems reviewed except for that noted above. Pertinent negatives and positives are:     · General: negative for fever, chills   · Ophthalmic ROS: negative for - eye pain or loss of vision  · ENT ROS: negative for - headaches, sore throat   · Respiratory: negative for - cough, sputum  · Cardiovascular: Reviewed in HPI  · Gastrointestinal: negative for - abdominal pain, diarrhea, N/V  · Hematology: negative for - bleeding, blood clots, bruising or jaundice  · Genito-Urinary:  negative for - Dysuria or incontinence  · Musculoskeletal: negative for - Joint swelling, muscle pain  · Neurological: negative for - confusion, dizziness, headaches   · Psychiatric: No anxiety, no depression. · Dermatological: negative for - rash    Physical Examination:  Vitals:    19 0845   BP: 123/74   Pulse: 65   Resp: 18   Temp: 98.2 °F (36.8 °C)   SpO2: 95%        Intake/Output Summary (Last 24 hours) at 5/3/2019 1331  Last data filed at 5/3/2019 0502  Gross per 24 hour   Intake 1200 ml   Output 600 ml   Net 600 ml     In: 960 [P.O.:960]  Out: 600    Wt Readings from Last 3 Encounters:   19 210 lb 8.6 oz (95.5 kg)   18 213 lb (96.6 kg)   18 213 lb 13.5 oz (97 kg)     Temp  Av.2 °F (36.8 °C)  Min: 97.7 °F (36.5 °C)  Max: 98.5 °F (36.9 °C)  Pulse  Av.3  Min: 65  Max: 79  BP  Min: 123/74  Max: 149/65  SpO2  Av.6 %  Min: 91 %  Max: 96 %    · Telemetry: Sinus rhythm v-rates 70's bpm.  · Constitutional: Alert. Oriented to person, place, and time. No distress. · Head: Normocephalic and atraumatic.    · Mouth/Throat: Lips appear moist. Oropharynx is clear and moist.  · Eyes: Conjunctivae Regurgitant jet is not well visualized.   No evidence of mitral stenosis.   The left atrium is moderately dilated. Cath: n/a    I independently reviewed the ECG and telemetry. Scheduled Meds:   insulin lispro  0-12 Units Subcutaneous TID WC    insulin lispro  0-6 Units Subcutaneous Nightly    docusate sodium  100 mg Oral Daily    aspirin  81 mg Oral Daily    atorvastatin  10 mg Oral Daily    losartan-hydrochlorothiazide  2 tablet Oral Daily    metoprolol succinate  50 mg Oral Daily    sodium chloride flush  10 mL Intravenous 2 times per day    enoxaparin  40 mg Subcutaneous Daily    nitroglycerin  0.5 inch Topical 4 times per day    nicotine  1 patch Transdermal Daily    furosemide  40 mg Intravenous BID     Continuous Infusions:   dextrose       PRN Meds:.glucose, dextrose, glucagon (rDNA), dextrose, albuterol sulfate HFA, sodium chloride flush, magnesium hydroxide, ondansetron, acetaminophen     Assessment:   Patient Active Problem List    Diagnosis Date Noted    LVH (left ventricular hypertrophy) due to hypertensive disease, with heart failure (HCC)      Priority: High    Cigarette nicotine dependence without complication      Priority: High    Chest pain 64/61/5425    Acute systolic heart failure ([facility] 12/18/2017    Atypical chest pain 12/18/2017    Essential hypertension 12/18/2017    Type 2 diabetes mellitus with hyperglycemia, without long-term current use of insulin ([facility] 12/18/2017    Acute decompensated heart failure ([facility] 12/18/2017      Active Hospital Problems    Diagnosis Date Noted    Chest pain [R07.9] 05/01/2019         Recommendation(s):    Acute on chronic systolic/diastolic CHF  -appears euvolemic at this time. Diuresed 1200 cc overnight (net negative 730cc) since being administered Lasix IV.  Would recommend another IV dose right now, and then restarting her po Lasix dose.  -we have arranged from CHF NP visit early next week for close monitoring after being discharged.  -educated the patient on 2g Na diet per day, which she has not been entirely compliant and voices difficulty in following consistently. - would recommend outpatient cardiac cath to evaluate coronary arteries. The last cath was 15-20 years ago, as per patient. Not necessary for inpatient cath. Stress test results duly noted. Chest tightness resolved after diuresing - suspect more CHF leading to chest tightness as opposed to angina from CAD. Will sign off and anticipate discharge of patient today, followed by close follow-up of patient with CHF NP early next week. Thank you for allowing me to participate in the care of [address] [place] [address] . If you have any questions/comments, please do not hesitate to contact us.       Sara Rodgers MD, MS, [facility], [facility]  Cardiac Electrophysiology  [address]  [address] [facility], [address] [place] [address]  Michele Price 429  [phone]

## 2019-05-03 NOTE — PROGRESS NOTES
Hospitalist Progress Note  5/3/2019 9:00 AM    PCP: Mely Garcia    [id number]     Date of Admission: 5/1/2019                                                                                                                     HOSPITAL COURSE    Patient demographics:  The patient  Cristina Chou is a 51 y.o. female     Significant past medical history:   Patient Active Problem List   Diagnosis    [facility] failure [facility]    Atypical chest pain    Essential hypertension    Type 2 diabetes mellitus with hyperglycemia, without long-term current use of insulin (HCC)    Acute decompensated [facility] failure ([facility]    LVH (left ventricular hypertrophy) due to hypertensive disease, with heart failure (HCC)    Cigarette nicotine dependence without complication    Chest pain         Presenting symptoms:      Diagnostic workup:      CONSULTS DURING ADMISSION :   IP CONSULT TO HEART FAILURE NURSE/COORDINATOR  IP CONSULT TO DIETITIAN  IP CONSULT TO SPIRITUAL SERVICES  IP CONSULT TO DIETITIAN  IP CONSULT TO HEART FAILURE NURSE/COORDINATOR  IP CONSULT TO HEART FAILURE NURSE/COORDINATOR      Patient was diagnosed with:        Treatment while inpatient:  Patient presented to the emergency room with chest pain and shortness of breath   Patient was diagnosed with acute on chronic combined systolic diastolic CHF                     Patient clinically responded to the diuretic treatment. Patient also underwent cardiac stress testing and echocardiogram.  Echocardiogram showed ejection fraction of 40% and grade 2 diastolic dysfunction. Patient's stress test was intermediate to high risk. cardiology was consulted                                                           ----------------------------------------------------------      SUBJECTIVE COMPLAINTS- follow-up for chest pain and shortness of breath    Diet: DIET CARDIAC; Carb Control: 4 carb choices (60 gms)/meal; Low Sodium (2 GM);  Daily Fluid Restriction: 1500 ml      OBJECTIVE:   Patient Active Problem List   Diagnosis    Acute systolic heart failure (HCC)    Atypical chest pain    Essential hypertension    Type 2 diabetes mellitus with hyperglycemia, without long-term current use of insulin (HCC)    Acute decompensated heart failure [facility]    LVH (left ventricular hypertrophy) due to hypertensive disease, with heart failure (HCC)    Cigarette nicotine dependence without complication    Chest pain       Allergies  Patient has no known allergies. Medications    Scheduled Meds:   aspirin  81 mg Oral Daily    atorvastatin  10 mg Oral Daily    losartan-hydrochlorothiazide  2 tablet Oral Daily    metoprolol succinate  50 mg Oral Daily    sodium chloride flush  10 mL Intravenous 2 times per day    enoxaparin  40 mg Subcutaneous Daily    nitroglycerin  0.5 inch Topical 4 times per day    nicotine  1 patch Transdermal Daily    furosemide  40 mg Intravenous BID     Continuous Infusions:  PRN Meds:  albuterol sulfate HFA, sodium chloride flush, magnesium hydroxide, ondansetron, acetaminophen    Vitals   Vitals /wt   Patient Vitals for the past 8 hrs:   BP Temp Temp src Pulse Resp SpO2 Weight   05/03/19 0845 123/74 98.2 °F (36.8 °C) Oral 65 18 95 % --   05/03/19 0837 -- -- -- -- -- 95 % --   05/03/19 0502 132/76 97.7 °F (36.5 °C) Oral 68 18 91 % 210 lb 8.6 oz (95.5 kg)        72HR INTAKE/OUTPUT:      Intake/Output Summary (Last 24 hours) at 5/3/2019 0900  Last data filed at 5/3/2019 0502  Gross per 24 hour   Intake 1440 ml   Output 1200 ml   Net 240 ml       Exam:    Gen:   Alert and oriented ×3  Eyes: PERRL. No sclera icterus. No conjunctival injection. ENT: No discharge. Pharynx clear. External appearance of ears and nose normal.  Neck: Trachea midline. No obvious mass. Resp: No accessory muscle use. No crackles. No wheezes. No rhonchi. CV: Regular rate. Regular rhythm. No murmur or rub. No edema. GI: Non-tender. Non-distended. No hernia.    Skin: Warm, dry, normal texture and turgor. Lymph: No cervical LAD. No supraclavicular LAD. M/S: / Ext. No cyanosis. No clubbing. No joint deformity. Neuro: CN 2-12 are intact,  no neurologic deficits noted. PT/INR: No results for input(s): PROTIME, INR in the last 72 hours. APTT: No results for input(s): APTT in the last 72 hours. CBC:   Recent Labs     05/01/19 0234 05/02/19 0625 05/03/19  0611   WBC 10.5 8.0 8.0   HGB 12.5 13.4 14.2   HCT 37.6 40.8 42.5   MCV 77.1* 77.5* 76.2*    295 300       BMP:   Recent Labs     05/01/19 0234 05/02/19  0625 05/03/19  0610    142 139   K 3.4* 3.2* 3.2*    98* 94*   CO2 29 31 33*   BUN 13 13 17   CREATININE 1.0 0.9 1.1       LIVER PROFILE:   Recent Labs     05/01/19 0234   ALKPHOS 114   AST 59*   ALT 55*   BILITOT 0.3     No results for input(s): AMYLASE in the last 72 hours. No results for input(s): LIPASE in the last 72 hours. UA:No results for input(s): NITRITE, LABCAST, WBCUA, RBCUA, MUCUS in the last 72 hours. TROPONIN:   Recent Labs     05/01/19  0234 05/01/19  0752 05/01/19  1109   TROPONINI 0.04* 0.04* <0.01       Lab Results   Component Value Date/Time    URRFLXCULT Not Indicated 11/25/2018 07:27 PM       No results for input(s): TSHREFLEX in the last 72 hours. No components found for: [id number]  POC GLUCOSE:  No results for input(s): POCGLU in the last 72 hours. No results for input(s): LABA1C in the last 72 hours.    Lab Results   Component Value Date    LABA1C 8.1 12/18/2017     echocardiogram  Ejection fraction 06%-IIHUG 2 diastolic dysfunction      ASSESSMENT AND PLAN  Acute on chronic combined systolic diastolic CHF  Start patient on IV Lasix  Strict intake output maintenance  Low-salt diet-730 mL fluid restriction  Consult CHF nurse  Daily weight  Echocardiogram shows ejection fraction of 40% and grade 2 diastolic dysfunction     Chest pain  Stress test is intermediate probability  Consult cardiology    Essential primary hypertension I10  Continue patient on home medication     Tobacco abuse Z72.0  Nicotine patch and counseling             DVT and GI prophylaxis           Code Status: Full Code        Dispo - Continue care        The patient and / or the family were informed of the results of any tests, a time was given to answer questions, a plan was proposed and they agreed with plan. Zee Gentile MD    This note was transcribed using [employer] Please disregard any translational errors.

## 2019-05-03 NOTE — FLOWSHEET NOTE
Discharge instructions and medications reviewed with patient. Pt. Verbalized understanding. Denies questions. Discontinued IV without complications. Pt. tolerated well. Daughter here to transport home.  Electronically signed by Ruth Adorno RN on 5/3/2019 at 6:24 PM

## 2019-05-03 NOTE — DISCHARGE INSTR - COC
Continuity of Care Form    Patient Name: Emperatriz Simms   :  1970  MRN:  [id number]    Admit date:  2019  Discharge date:  ***    Code Status Order: Full Code   Advance Directives:   Advance Care Flowsheet Documentation     Date/Time Healthcare Directive Type of Healthcare Directive Copy in [address] [facility] [address] Agent's Name Healthcare Agent's Phone Number    19 1213  No, patient does not have an advance directive for healthcare treatment -- -- -- -- --    19 0701  No, patient does not have an advance directive for healthcare treatment -- -- -- -- --          Admitting Physician:  Alexander Matthew MD  PCP: Andie Jenkins    Discharging Nurse: [facility] Unit/Room#: [id number]/[id number]  Discharging Unit Phone Number: ***    Emergency Contact:   Extended Emergency Contact Information  Primary Emergency Contact: Regina Marcial [address] Phone: [phone]  Relation: Other  Secondary Emergency Contact: Mamadou Moreland [address] Phone: [phone]  Relation: Child    Past Surgical History:  Past Surgical History:   Procedure Laterality Date    CARPAL TUNNEL RELEASE         Immunization History: There is no immunization history on file for this patient.     Active Problems:  Patient Active Problem List   Diagnosis Code    Acute systolic heart failure (HCC) I50.21    Atypical chest pain R07.89    Essential hypertension I10    Type 2 diabetes mellitus with hyperglycemia, without long-term current use of insulin (HCC) E11.65    Acute decompensated heart failure (HCC) I50.9    LVH (left ventricular hypertrophy) due to hypertensive disease, with heart failure (HCC) I11.0    Cigarette nicotine dependence without complication V43.174    Chest pain R07.9       Isolation/Infection:   Isolation          No Isolation            Nurse Assessment:  Last Vital Signs: /60   Pulse 70   Temp 98.2 °F (36.8 °C) (Oral)   Resp 16   Ht 5' 4\" (1.626 m)   Wt 210 lb 8.6 oz (95.5 kg)   LMP 05/01/2018   SpO2 96%   BMI 36.14 kg/m²     Last documented pain score (0-10 scale): Pain Level: 0  Last Weight:   Wt Readings from Last 1 Encounters:   05/03/19 210 lb 8.6 oz (95.5 kg)     Mental Status:  {IP PT MENTAL STATUS:33778}    IV Access:  { LEILANI IV ACCESS:407520801}    Nursing Mobility/ADLs:  Walking   {CHP DME SXIE:359149860}  Transfer  {CHP DME KWME:651698463}  Bathing  {CHP DME JHBR:818123133}  Dressing  {CHP DME LPDX:043882203}  Toileting  {CHP DME WLBI:931460058}  Feeding  {CHP DME RYOQ:340125901}  Med Admin  {P DME DIKF:898000772}  Med Delivery   { LEILANI MED Delivery:814994864}    Wound Care Documentation and Therapy:        Elimination:  Continence:   · Bowel: {YES / DU:08196}  · Bladder: {YES / EW:26129}  Urinary Catheter: {Urinary Catheter:264554431}   Colostomy/Ileostomy/Ileal Conduit: {YES / JL:67096}       Date of Last BM: ***    Intake/Output Summary (Last 24 hours) at 5/3/2019 1632  Last data filed at 5/3/2019 1613  Gross per 24 hour   Intake 1680 ml   Output 1300 ml   Net 380 ml     I/O last 3 completed shifts:   In: 46 [P.O.:1680]  Out: 600 [Urine:600]    Safety Concerns:     [address] [employer] Safety Concerns:569454667}    Impairments/Disabilities:      [address] [employer] Impairments/Disabilities:899921914}    Nutrition Therapy:  Current Nutrition Therapy:   [address] [employer] Diet List:110868038}    Routes of Feeding: {CHP DME Other Feedings:705466850}  Liquids: {Slp liquid thickness:09941}  Daily Fluid Restriction: {CHP DME Yes amt example:584735427}  Last Modified Barium Swallow with Video (Video Swallowing Test): {Done Not Done VWYH:453009855}    Treatments at the Time of Hospital Discharge:   Respiratory Treatments: ***  Oxygen Therapy:  {Therapy; copd oxygen:51834}  Ventilator:    {HARDEEP CALLES Vent XBYH:188490037}    Rehab Therapies: {THERAPEUTIC INTERVENTION:1612103453}  Weight Bearing Status/Restrictions: {HARDEEP CALLES Weight Bearin}  Other Medical Equipment (for information only, NOT a DME order):  {EQUIPMENT:164316920}  Other Treatments: ***    Patient's personal belongings (please select all that are sent with patient):  {CHP DME Belongings:490001657}    RN SIGNATURE:  {Esignature:545369668}    CASE MANAGEMENT/SOCIAL WORK SECTION    Inpatient Status Date: ***    Readmission Risk Assessment Score:  Readmission Risk              Risk of Unplanned Readmission:        9           Discharging to Facility/ Agency   · Name:   · Address:  · Phone:  · Fax:    Dialysis Facility (if applicable)   · Name:  · Address:  · Dialysis Schedule:  · Phone:  · Fax:    / signature: {Esignature:587367175}    PHYSICIAN SECTION    Prognosis: {Prognosis:8523007288}    Condition at Discharge: [address] [place] [address] Patient Condition:278802237}    Rehab Potential (if transferring to Rehab): {Prognosis:1820778526}    Recommended Labs or Other Treatments After Discharge: ***    Physician Certification: I certify the above information and transfer of Erika Hodge  is necessary for the continuing treatment of the diagnosis listed and that she requires {Admit to Appropriate Level of Care:72032} for {GREATER/LESS:292111598} 30 days.      Update Admission H&P: {CHP DME Changes in PLENU:750359814}    PHYSICIAN SIGNATURE:  {Esignature:399459521}

## 2019-05-03 NOTE — CONSULTS
HF RN consult received from Dr Paula Leyva as part of HF order set. Chart reviewed. Patient presented to ED with c/o CP / SOB for \"last few days\". She has known combined systolic and diastolic heart failure. She reports she \"stopped Lasix but not sure if supposed to. ..ran out of refills\". Patient follows at [facility] and was last seen in [facility] 10/18/2018 by Dr Dustin Bautista. At that time, she had not taken Lasix x 2 weeks and had stopped taking her Diovan \"because the pill was too big and her sister  taking a big pill\". She reported anxiety when swallowing pills. Per office notes, patient was falling asleep sitting up and had LE edema. She was started on Hyzaar at that time and instructed to take Lasix as directed. ProBNP on admission was 3414 with Cr 1.0. CXR showed mild pulm edema. Weight 216#. Patient was treated with IV bolus lasix. Echo  showed EF 40%, mild LVH, and Grade II DD c/w echo Dec 2017. Stress test showed no evidence of ischemia but noted severely dilated LV. Cardiology was consulted 5/3 who advised one additional dose of IV lasix then transition to PO as pt nearly euvolemic. Dr Talya Varela recommended OP LHC and signed off. HF measures were implemented at time of admission: daily weights, I /O and sodium / fluid restricted diet. HF careplan is current. HF instructions have been added to AVS.  Patient has received >60 mins of HF education provided by bedside staff. Follow up was arranged for  at 11:40am with Earnstine Severe NP. Patient also has existing PCP appt on 5/10 and exisiting cards appt on  with Dr Kaykay Bird at [address] was notified of need for discharge med rec.

## 2019-05-03 NOTE — PROGRESS NOTES
Pharmacy Heart Failure Medication Reconciliation Note    Pt discharged from [facility] today after admission for chest pain. Lien Zhang has a diagnosis of combined systolic and diastolic heart failure (last EF = 40% on 5/1/19). Patient taking an ACEI / ARB / Entresto:  Yes     Patient taking a BETA BLOCKER:  Yes  Patient taking a LOOP DIURETIC: Yes  Patient taking a ALDOSTERONE RECEPTOR ANTAGONIST: Yes    Corrections to discharge medications include:  Removed notes attached to medications that said \"patient no longer taking\" and \"patient ran out of\"    Added note for patient to avoid ibuprofen. [facility] agrees to advise her to avoid this. Bevely Bolus to also please encourage adherence to her medications.  Charly agrees but notes this has been a challenge. Discharge Medications:         Medication List        CHANGE how you take these medications      Potassium 75 MG Tabs  Take 10 mEq by mouth daily Powder  What changed:    medication strength  how much to take  when to take this  reasons to take this            CONTINUE taking these medications      albuterol sulfate  (90 Base) MCG/ACT inhaler  Commonly known as:  PROAIR HFA  1-2 puffs every 4 hours while awake for 7-10 days then PRN wheezing  Dispense with SPACER and Instruct on use. May sub Ventolin or Proventil as needed per [employer]. aspirin 81 MG chewable tablet  Take 1 tablet by mouth daily     atorvastatin 10 MG tablet  Commonly known as:  LIPITOR  Take 1 tablet by mouth daily     furosemide 40 MG tablet  Commonly known as:  LASIX  Take 1 tablet by mouth daily     ibuprofen 800 MG tablet  Commonly known as:  ADVIL;MOTRIN  Take 1 tablet by mouth every 8 hours as needed for Pain  Notes to patient: It is recommended to avoid NSAIDs or stop whenever possible if you have current symptoms or prior symptoms of heart failure.      NSAIDs may decrease the response to your water pill & may cause you to hold on to extra fluid and worsen symptoms such as swelling and shortness of breath.     NSAIDs include ibuprofen (Advil, Motrin), naproxen (Aleve), meloxicam (Mobic), diclofenac (Voltaren)     losartan-hydrochlorothiazide 100-25 MG per tablet  Commonly known as:  HYZAAR  Take 1 tablet by mouth daily     metFORMIN 500 MG tablet  Commonly known as:  GLUCOPHAGE     metoprolol succinate 50 MG extended release tablet  Commonly known as:  TOPROL XL  Take 1 tablet by mouth daily     spironolactone 25 MG tablet  Commonly known as:  ALDACTONE  Take 1 tablet by mouth daily               Where to Get Your Medications        These medications were sent to [address] [place] [address] Rd, [facility] [phone]  [address] [place] [place] [address], [address] [facility]      Phone:  [phone]   Potassium 75 MG Tabs  spironolactone 25 MG tablet         Svetlana Verdugo, PharmD  Heart Failure Discharge Medication Reconciliation Program  [phone]

## 2019-05-03 NOTE — PLAN OF CARE
Problem: Pain:  Goal: Control of acute pain  Description  Control of acute pain  Outcome: Ongoing  Note:   D: PT. Without complaints of pain  A: encouraged to notify if chest pain reoccurs     Problem: Falls - Risk of:  Goal: Will remain free from falls  Description  Will remain free from falls  Outcome: Completed  Note:   PT. Alert and oriented, gait is steady and Pt.  Up ad aleksandr     Problem: OXYGENATION/RESPIRATORY FUNCTION  Goal: Patient will maintain patent airway  Outcome: Ongoing

## 2019-05-06 ENCOUNTER — TELEPHONE (OUTPATIENT)
Dept: CARDIOLOGY CLINIC | Age: 49
End: 2019-05-06

## 2019-05-06 NOTE — TELEPHONE ENCOUNTER
I have not seen this patient, but discussed with Dr. Sobeida Galindo who saw her in the hospital.  He states ok for her to return to work at this time. She needs to keep appointment with me later this week to evaluate status of her CHF.

## 2019-05-06 NOTE — LETTER
[facility] HEART [address]. [place]  Phone: [phone]  Fax: [phone]      Damian Antony MD      May 6, 2019    Nusrat Jones [address] [facility] [address]      To Whom It May Concern:    Abril Cooper is currently under my care. She may return to work on 05/07/19. If you have any questions or concerns, please don't hesitate to call.     Sincerely,        Damian Antony MD

## 2019-05-06 NOTE — LETTER
[facility] HEART [address] [place] [address]  Phone: [phone]  Fax: [phone]    ALBA Camacho CNP        May 6, 2019    Jess Nayak [address] [place] [id number]      To Whom It May Concern:    Won Saavedra has been under my care for cardiac health. She may return to work on 05/07/19. If you have any questions or concerns, please don't hesitate to call.     Sincerely,        ALBA Camacho - CNP

## 2019-05-06 NOTE — TELEPHONE ENCOUNTER
Called/spoke to pt. Will go back to work tomorrow. Will have letter typed/signed. Pt to call back with fax number. Pt called back with fax number [phone].

## 2019-05-06 NOTE — TELEPHONE ENCOUNTER
Sammiharinder Tonio called in this afternoon wanting to know when she can return to work. She stated she just got discharged from the hospital on Friday 5/3/19. She has a follow up with Alexandra Martinez NP on Wed. 5/8/19.     You can reach Kristine Elizalde at #[phone]

## 2019-05-06 NOTE — DISCHARGE SUMMARY
Hospital Medicine Discharge Summary      Patient ID: Juan Manuel Gama [id number]     Patient's PCP: Holly Baker    Admit Date: 5/1/2019     Discharge Date: 5/3/2019      Admitting Physician: Cassie Pope MD    Discharge Physician: Herbie Hurt MD     Discharge Diagnoses: Active Hospital Problems    Diagnosis Date Noted    Chest pain [R07.9] 05/01/2019         The patient was seen and examined on the day of discharge and this discharge summary is in conjunction with any daily progress note from day of discharge. Hospital Course:      Patient demographics:  The patient  Leidy Soto is a 51 y.o. female      Significant past medical history:       Patient Active Problem List   Diagnosis    [facility] [address]    Atypical chest pain    Essential hypertension    Type 2 diabetes mellitus with hyperglycemia, without long-term current use of insulin (HCC)    Acute decompensated heart failure ([facility] [address] [facility]    LVH (left ventricular hypertrophy) due to hypertensive disease, with heart failure (HCC)    Cigarette nicotine dependence without complication    Chest pain            Presenting symptoms:   Chest Pain   Shortness of Breath        CONSULTS DURING ADMISSION :   IP CONSULT TO HEART FAILURE NURSE/COORDINATOR  IP CONSULT TO DIETITIAN  IP CONSULT TO SPIRITUAL SERVICES  IP CONSULT TO DIETITIAN  IP CONSULT TO HEART FAILURE NURSE/COORDINATOR  IP CONSULT TO HEART FAILURE NURSE/COORDINATOR        Patient was diagnosed with:   Acute on chronic combined systolic diastolic CHF  Chest pain  Essential primary hypertensio  Tobacco abuse          Treatment while inpatient:  Patient presented to the emergency room with chest pain and shortness of breath   Patient was diagnosed with acute on chronic combined systolic diastolic CHF                     Patient clinically responded to the diuretic treatment.   Patient also underwent cardiac stress testing and echocardiogram. Echocardiogram showed ejection fraction of 40% and grade 2 diastolic dysfunction. Patient's stress test was intermediate to high risk. cardiology was consulted   Cardiology recommended outpatient cardiac catheterization. CHF nurse educator educated the patient. Discharge Condition:  stable:    Discharged to:  home    Activity:   as tolerated: Follow Up: Follow-up with cardiology in 1 week              Labs: For convenience and continuity at follow-up the following most recent labs are provided:      CBC:   Lab Results   Component Value Date    WBC 8.0 05/03/2019    HGB 14.2 05/03/2019    HCT 42.5 05/03/2019     05/03/2019       RENAL:   Lab Results   Component Value Date     05/03/2019    K 3.2 05/03/2019    K 3.6 08/13/2018    CL 94 05/03/2019    CO2 33 05/03/2019    BUN 17 05/03/2019    CREATININE 1.1 05/03/2019           Discharge Medications:    Ally Sampson   Home Medication Instructions [id number]    Printed on:05/05/19 3523   Medication Information                      albuterol sulfate HFA (PROAIR HFA) 108 (90 Base) MCG/ACT inhaler  1-2 puffs every 4 hours while awake for 7-10 days then PRN wheezing  Dispense with SPACER and Instruct on use. May sub Ventolin or Proventil as needed per [employer].              aspirin 81 MG chewable tablet  Take 1 tablet by mouth daily             atorvastatin (LIPITOR) 10 MG tablet  Take 1 tablet by mouth daily             furosemide (LASIX) 40 MG tablet  Take 1 tablet by mouth daily             ibuprofen (ADVIL;MOTRIN) 800 MG tablet  Take 1 tablet by mouth every 8 hours as needed for Pain             losartan-hydrochlorothiazide (HYZAAR) 100-25 MG per tablet  Take 1 tablet by mouth daily             metFORMIN (GLUCOPHAGE) 500 MG tablet  Take 500 mg by mouth 2 times daily (with meals)              metoprolol succinate (TOPROL XL) 50 MG extended release tablet  Take 1 tablet by mouth daily             Potassium 75 MG TABS  Take 10 mEq by mouth daily Powder             spironolactone (ALDACTONE) 25 MG tablet  Take 1 tablet by mouth daily                    Time Spent on discharge is more than 30 min in the examination, evaluation, counseling and review of medications and discharge plan. Signed:  Frannie Rodriguez MD   5/5/2019      Thank you Alanna Barba for the opportunity to be involved in this patient's care. If you have any questions or concerns please feel free to contact me at [phone]. This note was transcribed using [id number]. Please disregard any translational errors.

## 2019-05-07 NOTE — PROGRESS NOTES
[address]  Office Visit    Leidy Soto  1970    May 8, 2019    CC:   Chief Complaint   Patient presents with    Follow-Up from Hospital     HF, CP    Fatigue     HPI:  The patient is 51 y.o. female with a past medical history significant for cardiomyopathy, CHF, HTN, diabetes mellitus, LAURA and nicotine addiction who is here for hospital follow up. Admitted at [facility] from 5/1/2019-5/3/2019 after presenting with chest tightness and dyspnea. Her CXR showed pulmonary edema and ECG with chronic anterior-lateral TW inversion. She admitted to running out of her medications and hadn't been taking her lasix. Dx with acute on chronic systolic and diastolic HF and she was diuresed, seen by Dr. Antonia Harkins and sent home. Her chest tightness improved after diuresis. Echo and stress testing were performed: LVEF 40% and stress test intermediate to high risk scan. Recommendation was for outpatient cardiac catheterization. Apparently \"normal\" cath several years ago (\"@ least 20 years ago\"). Overall doing okay. Went back to work yesterday and felt tired at end of the day. Has noted increased SOB with walking to car at work yesterday associated  With \"gnwing\" pain in L upper chest that improved with stopping or sitting down. Has noted ~ 4 episodes of significant dizziness that have occurred with sitting and standing, lasting 5-6 seconds and the goes away. All have occurred after AM medications. Denies orthopnea/PND, cough, palpitations,  syncope, edema , weight change or claudication. Home weight has remained stable. Monitoring sodium and fluid intake \"as best I can. \"  Reviewed diet with pt and she has had diet high in sodium ([employer] burgers/onion chips, instant mashed potatoes, etc). Has LAURA but has not been using her CPAP machine (needs new supplies, etc). Review of Systems:  Constitutional: Denies  fatigue, weakness, night sweats or fever.    HEENT: Denies new visual changes, ringing in ears, nosebleeds,nasal congestion  Respiratory: Denies new or change in SOB, PND, orthopnea or cough. Cardiovascular: see HPI  GI: Denies N/V, diarrhea, constipation, abdominal pain, change in bowel habits, melena or hematochezia  : Denies urinary frequency, urgency, incontinence, hematuria or dysuria. Skin: Denies rash, hives, or cyanosis  Musculoskeletal: Denies joint or muscle aches/pain  Neurological: Denies syncope or TIA-like symptoms. Psychiatric: Denies anxiety, insomnia or depression     Past Medical History:   Diagnosis Date    [facility] [address]     [facility]     Hypertension     Hypokalemia     Nicotine addiction     LAURA (obstructive sleep apnea)      Past Surgical History:   Procedure Laterality Date    CARPAL TUNNEL RELEASE       Family History   Problem Relation Age of Onset    Heart Failure Maternal Aunt      Social History     Tobacco Use    Smoking status: Current Every Day Smoker     Packs/day: 0.50     Types: Cigarettes    Smokeless tobacco: Never Used   Substance Use Topics    Alcohol use: Yes     Alcohol/week: 0.0 - 0.6 oz     Comment: occasional    Drug use: No       No Known Allergies  Current Outpatient Medications   Medication Sig Dispense Refill    metoprolol succinate (TOPROL XL) 100 MG extended release tablet Take 1 tablet by mouth daily 30 tablet 2    spironolactone (ALDACTONE) 25 MG tablet Take 1 tablet by mouth daily 30 tablet 3    Potassium 75 MG TABS Take 10 mEq by mouth daily Powder 30 tablet 0    albuterol sulfate HFA (PROAIR HFA) 108 (90 Base) MCG/ACT inhaler 1-2 puffs every 4 hours while awake for 7-10 days then PRN wheezing  Dispense with SPACER and Instruct on use. May sub Ventolin or Proventil as needed per [employer].  1 Inhaler 3    furosemide (LASIX) 40 MG tablet Take 1 tablet by mouth daily 180 tablet 5    losartan-hydrochlorothiazide (HYZAAR) 100-25 MG per tablet Take 1 tablet by mouth daily 90 tablet 5    aspirin 81 MG chewable tablet Take 1 tablet by mouth daily 30 tablet 3    atorvastatin (LIPITOR) 10 MG tablet Take 1 tablet by mouth daily 30 tablet 3    metFORMIN (GLUCOPHAGE) 500 MG tablet Take 500 mg by mouth 2 times daily (with meals)        No current facility-administered medications for this visit. Physical Exam:   /78   Pulse 84   Ht 5' 4\" (1.626 m)   Wt 208 lb (94.3 kg)   LMP 05/01/2018   SpO2 98%   BMI 35.70 kg/m²   Wt Readings from Last 2 Encounters:   05/08/19 208 lb (94.3 kg)   05/03/19 210 lb 8.6 oz (95.5 kg)     Constitutional: She is oriented to person, place, and time. She appears well-developed and well-nourished. In no acute distress. HEENT: Normocephalic and atraumatic. Sclerae anicteric. No xanthelasmas. EOM's intact. Neck: Neck supple. No JVD present. Carotids without bruits. + generous thyroid on palpation. No lymphadenopathy present. Cardiovascular: RRR, normal S1 and S2; no murmur/gallop or rub, PMI nondisplaced  Pulmonary/Chest: Effort normal.  Lungs clear to auscultation. Chest wall nontender  Abdominal: soft, nontender, nondistended. + bowel sounds; no hepatomegaly  Extremities: No edema, cyanosis, or clubbing. Pulses are 2+ radial/DP/PT bilaterally. Cap refill brisk. Neurological: No focal deficit. Skin: Skin is warm and dry. Psychiatric: She has a normal mood and affect.  Her speech is normal and behavior is normal.     Lab Review:   Lab Results   Component Value Date    TRIG 137 05/02/2019    HDL 34 05/02/2019    LDLCALC 81 05/02/2019    LABVLDL 27 05/02/2019     Lab Results   Component Value Date     05/03/2019    K 3.2 05/03/2019    K 3.6 08/13/2018    CL 94 05/03/2019    CO2 33 05/03/2019    BUN 17 05/03/2019    CREATININE 1.1 05/03/2019    GLUCOSE 203 05/03/2019    CALCIUM 9.3 05/03/2019      Lab Results   Component Value Date    WBC 8.0 05/03/2019    HGB 14.2 05/03/2019    HCT 42.5 05/03/2019    MCV 76.2 (L) 05/03/2019     05/03/2019 5/1/2019 Lexiscan-Myoview: There is some breast tissue attenuation (artifact) on both stress and rest    images.    There is normal isotope uptake at stress and rest. There is no evidence of    myocardial ischemia or scar. TID of 0.97    Severely dilated LV with midly depressed systolic function.    Left ventricular ejection fraction of 40 %. The inferior, apical and septal    walls are more hypokinetic compared with other segments.    Overall findings represent a intermediate to high risk scan. 5/1/2019 Echo:  Left ventricular cavity size is mildly dilated. There is mild concentric  left ventricular hypertrophy. Overall left ventricular systolic function   appears moderately reduced. Ejection fraction is visually estimated to be   40%. There is moderate diffuse hypokinesis. Diastolic filling parameters   suggest grade II diastolic dysfunction.   Mitral valve leaflets appear mildly thickened.   Probably mild mitral regurgitation. Regurgitant jet is not well visualized.   No evidence of mitral stenosis.   The left atrium is moderately dilated. 5/2018 GXT:  Negative treadmill exercise stress test.   The patient ex. for 7.11 min. on the Aftab protocol to achieve a HR of 150   which is 88 % of PMHR.   No Chest pain or ischemic ST changes. Echo 12/2017:  Overall left ventricular systolic function appears moderately reduced.   Ejection fraction is visually estimated to be around 40%. Mildly dilated  left ventricle. There is mildly increased left ventricular wall thickness.   Diastolic filling parameters suggest grade II diastolic dysfunction.   Normal right ventricular size and function.   The left atrium is mildly dilated.   Mild eccentric mitral regurgitation with posteriorly directed jet.   Trivial tricuspid regurgitation.   A bubble study was performed and fails to show evidence of right to left shunting. Assessment:    1.  Chronic combined systolic and diastolic heart failure (HCC)  -currently compensated; NYHA [phone]

## 2019-05-08 ENCOUNTER — OFFICE VISIT (OUTPATIENT)
Dept: CARDIOLOGY CLINIC | Age: 49
End: 2019-05-08
Payer: COMMERCIAL

## 2019-05-08 VITALS
HEIGHT: 64 IN | WEIGHT: 208 LBS | OXYGEN SATURATION: 98 % | HEART RATE: 84 BPM | SYSTOLIC BLOOD PRESSURE: 136 MMHG | DIASTOLIC BLOOD PRESSURE: 78 MMHG | BODY MASS INDEX: 35.51 KG/M2

## 2019-05-08 DIAGNOSIS — I10 ESSENTIAL HYPERTENSION: ICD-10-CM

## 2019-05-08 DIAGNOSIS — E01.0 THYROMEGALY: ICD-10-CM

## 2019-05-08 DIAGNOSIS — Z72.0 TOBACCO ABUSE: ICD-10-CM

## 2019-05-08 DIAGNOSIS — I50.42 CHRONIC COMBINED SYSTOLIC AND DIASTOLIC HEART FAILURE (HCC): Primary | ICD-10-CM

## 2019-05-08 DIAGNOSIS — I50.42 CHRONIC COMBINED SYSTOLIC AND DIASTOLIC HEART FAILURE (HCC): ICD-10-CM

## 2019-05-08 DIAGNOSIS — G47.33 OSA (OBSTRUCTIVE SLEEP APNEA): ICD-10-CM

## 2019-05-08 LAB
T4 TOTAL: 8.2 UG/DL (ref 4.5–10.9)
TSH REFLEX: 0.8 UIU/ML (ref 0.27–4.2)

## 2019-05-08 PROCEDURE — 99214 OFFICE O/P EST MOD 30 MIN: CPT | Performed by: NURSE PRACTITIONER

## 2019-05-08 RX ORDER — METOPROLOL SUCCINATE 100 MG/1
100 TABLET, EXTENDED RELEASE ORAL DAILY
Qty: 30 TABLET | Refills: 2 | Status: SHIPPED | OUTPATIENT
Start: 2019-05-08 | End: 2020-07-29

## 2019-05-08 NOTE — LETTER
[facility] HEART [facility]. [place] [address]  Phone: [phone]  Fax: [phone]    ALBA Lauren CNP        May 8, 2019     [address]    Patient: Sandra Dove  MR Number: [id number]  YOB: 1970  Date of Visit: 5/8/2019    Dear Dr. Janina Cook:    Thank you for the request for consultation for Riley Adames. If you have questions, please do not hesitate to call me. I look forward to following Sammi Love along with you.     Sincerely,        ALBA Lauren CNP

## 2019-05-08 NOTE — PATIENT INSTRUCTIONS
Cardiomyopathy with congestive heart failure    Increase Metoprolol Succinate (Toprol XL) to 100 mg daily    Continue other medications    Labs: TSH/T4; BMP

## 2019-05-10 ENCOUNTER — SCHEDULED TELEPHONE ENCOUNTER (OUTPATIENT)
Dept: PHARMACY | Age: 49
End: 2019-05-10

## 2019-05-10 RX ORDER — LOSARTAN POTASSIUM AND HYDROCHLOROTHIAZIDE 25; 100 MG/1; MG/1
1 TABLET ORAL DAILY
Qty: 90 TABLET | Refills: 5 | Status: SHIPPED | OUTPATIENT
Start: 2019-05-10 | End: 2019-05-21

## 2019-05-10 NOTE — TELEPHONE ENCOUNTER
[address] [facility]  Heart Failure Service  [phone]        Follow up phone call:     Are you having any issues that need immediate attention? No     Do you have any signs or symptoms of edema? No              []  Shortness of breath              []  Swelling of hands, feet, ankles, or lower legs              []  Abdominal fullness              []  Cough, especially at night or when you lie down              []  Fatigue that limits activity     Do you have any other signs or symptoms to report? No              []  Dizziness              []  Light headedness, like you are going to pass out              []  Headache                                                                                []  Other    Wt Readings from Last 6 Encounters:   05/08/19 208 lb (94.3 kg)   05/03/19 210 lb 8.6 oz (95.5 kg)   11/25/18 213 lb (96.6 kg)   11/23/18 213 lb 13.5 oz (97 kg)   10/18/18 215 lb (97.5 kg)   09/05/18 219 lb 12.8 oz (99.7 kg)         Do you have a working scale? Yes     Have you been checking your weight daily? Yes              If not, the patient was encouraged to do so.     Dry weight = 208 at discharge   What is your weight today? 209.5     With regards to your weight, when would you call the doctor? [x] increased by 3 pounds or more in one day               [x] 5 pounds or more in a week              [x] weight above my dry weight              [] other                 [] unknown     Has your weight increased by 3 pounds or more in one day or 5 pounds or more in a week? No          Please call the [facility] at [phone] or your Cardiologist (Cara South @ [phone]) if you have a weight gain of 3 pounds or more in one day or 5 pounds or more in a week or above your dry weight.     Have you been admitted to the hospital or visited an emergency room recently?  Yes If yes, be sure to follow the medication instructions given to you when you were sent home.     Are you out of or have you missed any of your medications ? No    Do you have any questions about your medications? No     []  Do not take any NSAIDs (non steroidal anti inflammatory drugs) like Aleve          (naproxen), Advil and Motrin (ibuprofen), Mobic (diclofenac) and Celebrex                       (celecoxib).    It is okay to take Tylenol (acetaminophen) unless your physician has told you        Otherwise.     [x]  Limit how much fluid you drink to 1,500-2,000mL (48-64 ounces) per day.     [x]  Follow your low sodium diet of 2,000-2,400mg per day.      Do you know what type of foods you should limit or avoid? [x] table salt              [x] salty foods like chips, soups, processed meats lie melissa, sausage, or                                 lunchmeat              [] other                 [] unknown      Do you have a follow up scheduled with your Cardiologist Yes  Do you have a follow up scheduled with An [facility]on[facility]a Yes     Do you have any questions or concerns that I can help you with today? No     Please call any time, especially with increases in your weight or shortness of breath or increased swelling.  Do not wait to call.          Appropriate staff has been notified with regards to any concerns noted above   Dario   Heart Failure Service  [phone]

## 2019-05-14 ENCOUNTER — OFFICE VISIT (OUTPATIENT)
Dept: SLEEP MEDICINE | Age: 49
End: 2019-05-14
Payer: COMMERCIAL

## 2019-05-14 VITALS
WEIGHT: 211 LBS | SYSTOLIC BLOOD PRESSURE: 152 MMHG | TEMPERATURE: 98.5 F | HEIGHT: 64 IN | HEART RATE: 77 BPM | DIASTOLIC BLOOD PRESSURE: 90 MMHG | OXYGEN SATURATION: 96 % | BODY MASS INDEX: 36.02 KG/M2

## 2019-05-14 DIAGNOSIS — Z86.79 H/O CHF: ICD-10-CM

## 2019-05-14 DIAGNOSIS — I10 ESSENTIAL HYPERTENSION: ICD-10-CM

## 2019-05-14 DIAGNOSIS — G47.33 OBSTRUCTIVE SLEEP APNEA: Primary | ICD-10-CM

## 2019-05-14 PROBLEM — E87.6 HYPOKALEMIA: Status: ACTIVE | Noted: 2017-09-06

## 2019-05-14 PROCEDURE — 99203 OFFICE O/P NEW LOW 30 MIN: CPT | Performed by: PSYCHIATRY & NEUROLOGY

## 2019-05-14 RX ORDER — COVID-19 ANTIGEN TEST
220 KIT MISCELLANEOUS
COMMUNITY
End: 2019-06-24 | Stop reason: ALTCHOICE

## 2019-05-14 RX ORDER — NICOTINE 21 MG/24HR
PATCH, TRANSDERMAL 24 HOURS TRANSDERMAL
COMMUNITY
Start: 2019-05-10 | End: 2020-11-25

## 2019-05-14 RX ORDER — OLOPATADINE HYDROCHLORIDE 1 MG/ML
SOLUTION/ DROPS OPHTHALMIC
COMMUNITY
Start: 2019-05-10

## 2019-05-14 ASSESSMENT — SLEEP AND FATIGUE QUESTIONNAIRES
HOW LIKELY ARE YOU TO NOD OFF OR FALL ASLEEP WHILE SITTING AND READING: 2
HOW LIKELY ARE YOU TO NOD OFF OR FALL ASLEEP WHILE SITTING QUIETLY AFTER LUNCH WITHOUT ALCOHOL: 2
HOW LIKELY ARE YOU TO NOD OFF OR FALL ASLEEP WHILE SITTING AND TALKING TO SOMEONE: 2
HOW LIKELY ARE YOU TO NOD OFF OR FALL ASLEEP IN A CAR, WHILE STOPPED FOR A FEW MINUTES IN TRAFFIC: 1
NECK CIRCUMFERENCE (INCHES): 16
HOW LIKELY ARE YOU TO NOD OFF OR FALL ASLEEP WHEN YOU ARE A PASSENGER IN A CAR FOR AN HOUR WITHOUT A BREAK: 2
ESS TOTAL SCORE: 15
HOW LIKELY ARE YOU TO NOD OFF OR FALL ASLEEP WHILE LYING DOWN TO REST IN THE AFTERNOON WHEN CIRCUMSTANCES PERMIT: 2
HOW LIKELY ARE YOU TO NOD OFF OR FALL ASLEEP WHILE WATCHING TV: 2
HOW LIKELY ARE YOU TO NOD OFF OR FALL ASLEEP WHILE SITTING INACTIVE IN A PUBLIC PLACE: 2

## 2019-05-14 ASSESSMENT — ENCOUNTER SYMPTOMS
EYES NEGATIVE: 1
SHORTNESS OF BREATH: 1
ALLERGIC/IMMUNOLOGIC NEGATIVE: 1
GASTROINTESTINAL NEGATIVE: 1

## 2019-05-14 NOTE — PROGRESS NOTES
MD JAVID Cavanaugh Board Certified in Sleep Medicine  Certified [facility] Sleep Medicine  Board Certified in Neurology [address]  [address] [facility] [address] [address],  [address] S[address]  326 [facility] ([address] [facility]  [address], [address]         [address]  [facility] [place] [address] [phone]    Subjective:     Patient ID: Emperatriz Simms is a 51 y.o. female. Chief Complaint   Patient presents with    New Patient     pt not using cpap machine, needs tubes for machine x 1 year without use       HPI:        Emperatriz Simms is a 51 y.o. female referred by Dr Dixon French for LAURA managment. Patient  was diagnosed with mild obstructive sleep apnea about 6 years ago, was on CPAP machine at 7 [facility], last sleep study was 6 years ago, last PAP titration about 6 years ago. Patient currently is not  using the PAP machine, she as unblae to use it enough hours to be able to get CPAP supplies. She complains of snoring, snorting, choking, periods of not breathing, tossing and turning, excessive daytime sleepiness, feels sleepy during the day but she denies knees buckling with laughing, completely or partially paralyzed while falling asleep or waking up, noisy environment, uncomfortable room temperature, uncomfortable bedding. Symptoms began several years ago, gradually worsening since that time. The patient's bed-partner confirmed the snoring and stopped breathing at night  SLEEP SCHEDULE: Goes to bed around 3 AM in theweekdays and 3 AM in the weekends. It usually takes the patient 30 minutes to fall asleep. The patient gets up 1-2 per night to go to the bathroom. The Patient finally gets up at 7:45 AM during the weekdays and 8-9 AM in the weekends. patient wakes up with dry mouth and sometimes morning headache. . the headache usually dull headache lasts 30-60 minutes. The patient has restless sleep with frequent arousals in addition to the Patient has significant daytime sleepiness. The Patient scored Total score: 15 on Otley Sleepiness Scale ( more than 10 is indicative of daytime sleepiness)and 24 in fatigue scale ( more than 36 is indicativeof daytime fatigue). The patient takes 1-2 naps/month for 30 minutes and usually is  refreshing nap. Previous evaluation and treatment has included- PSG with C PAP titration. 6 years ago, has not used the CPAP for several years. Had study done on 09/05/2013 which showed an AHI - 11.8/hr with Low SaO2 - 82%. This is consistent with mild LAURA (327.23)    He has been obese for many years and tried, has gained 50 pounds in the last 5 years. DOT/CDL - No  FAA/'slicense - No      Previous Report(s) Reviewed: historical medical records         Social History     Socioeconomic History    Marital status: Single     Spouse name: Not on file    Number of children: Not on file    Years of education: Not on file    Highest education level: Not on file   Occupational History    Not on file   Social Needs    Financial resource strain: Not on file    Food insecurity:     Worry: Not on file     Inability: Not on file    Transportation needs:     Medical: Not on file     Non-medical: Not on file   Tobacco Use    Smoking status: Current Every Day Smoker     Packs/day: 0.50     Types: Cigarettes    Smokeless tobacco: Never Used   Substance and Sexual Activity    Alcohol use:  Yes     Alcohol/week: 0.0 - 0.6 oz     Comment: occasional    Drug use: No    Sexual activity: Not on file   Lifestyle    Physical activity:     Days per week: Not on file     Minutes per session: Not on file    Stress: Not on file   Relationships    Social connections:     Talks on phone: Not on file     Gets together: Not on file     Attends Jehovah's witness service: Not on file     Active member of club or organization: Not on file     Attends meetings of clubs or organizations: Not on file     Relationship status: Not on file    Intimate partner violence:     Fear of current or ex partner: Not on file     Emotionally abused: Not on file     Physically abused: Not on file     Forced sexual activity: Not on file   Other Topics Concern    Not on file   Social History Narrative    Not on file       Prior to Admission medications    Medication Sig Start Date End Date Taking? Authorizing Provider   Lancets 30G MISC Inject 1 Stick into the skin 6/30/16  Yes Historical Provider, MD   olopatadine (PATANOL) 0.1 % ophthalmic solution Apply to eye 5/10/19  Yes Historical Provider, MD   nicotine (Ashia Fickle) 21 MG/24HR  5/10/19  Yes Historical Provider, MD   Naproxen Sodium 220 MG CAPS Take 220 tablets by mouth   Yes Historical Provider, MD   losartan-hydrochlorothiazide (HYZAAR) 100-25 MG per tablet Take 1 tablet by mouth daily 5/10/19  Yes Luis Wong MD   metoprolol succinate (TOPROL XL) 100 MG extended release tablet Take 1 tablet by mouth daily 5/8/19  Yes Betty Denny APRN - CNP   spironolactone (ALDACTONE) 25 MG tablet Take 1 tablet by mouth daily 5/3/19  Yes Bart Mendoza MD   albuterol sulfate HFA (PROAIR HFA) 108 (90 Base) MCG/ACT inhaler 1-2 puffs every 4 hours while awake for 7-10 days then PRN wheezing  Dispense with SPACER and Instruct on use. May sub Ventolin or Proventil as needed per [employer].  11/25/18  Yes ALBA Perera CNP   aspirin 81 MG chewable tablet Take 1 tablet by mouth daily 12/22/17  Yes Michele Ibanez MD   metFORMIN (GLUCOPHAGE) 500 MG tablet Take 500 mg by mouth 2 times daily (with meals)  8/26/17  Yes Historical Provider, MD   Potassium 75 MG TABS Take 10 mEq by mouth daily Powder 5/3/19   Bart Mendoza MD   furosemide (LASIX) 40 MG tablet Take 1 tablet by mouth daily 10/18/18   Luis Wong MD   atorvastatin (LIPITOR) 10 MG tablet Take 1 tablet by mouth daily 12/22/17   Michele Ibanez MD       Allergies as of 05/14/2019    (No Known Allergies)       Patient Active Problem List   Diagnosis    Acute systolic heart failure (HCC)    Atypical chest pain    Essential hypertension    Type 2 diabetes mellitus with hyperglycemia, without long-term current use of insulin (HCC)    Acute decompensated heart failure ([facility]    LVH (left ventricular hypertrophy) due to hypertensive disease, with heart failure (HCC)    Cigarette nicotine dependence without complication    Chest pain    Xerosis of skin    Vocal cord polyps    Urine abnormality    Urinary tract infection    Tobacco use disorder    Contact dermatitis    Seasonal and perennial allergic rhinitis    Rhinosinusitis    Preoperative examination    Other chronic allergic conjunctivitis    Obstructive sleep apnea on CPAP    Hypokalemia    Fatigue    Hoarseness    Contraception    Dermatosis papulosa nigra    Encounter for long-term (current) use of medications    Esophageal reflux    Chronic rhinitis    Cervical radiculopathy    Carpal tunnel syndrome, bilateral    Carpal tunnel syndrome    Asymptomatic varicose veins    Acne vulgaris    Abnormal mammogram    Well woman exam with routine gynecological exam    Encounter for general adult medical examination without abnormal findings       Past Medical History:   Diagnosis Date    CHF (congestive heart failure) ([facility]     Diabetes mellitus ([facility] [address] [facility]     Hypertension     Hypokalemia     Nicotine addiction     LAURA (obstructive sleep apnea)        Past Surgical History:   Procedure Laterality Date    CARPAL TUNNEL RELEASE         Family History   Problem Relation Age of Onset    Heart Failure Maternal Aunt        Review of Systems   Constitutional: Positive for diaphoresis, fatigue and unexpected weight change. HENT: Positive for congestion. Eyes: Negative. Respiratory: Positive for shortness of breath. Cardiovascular: Positive for leg swelling. Gastrointestinal: Negative. Endocrine: Negative. Genitourinary: Positive for frequency. Musculoskeletal: Positive for arthralgias and myalgias. Skin: Positive for rash. Allergic/Immunologic: Negative. Neurological: Negative. Hematological: Negative. Psychiatric/Behavioral: Positive for agitation. All other systems reviewed and are negative. Objective:     Vitals:  Weight BMI Neck circumference    Wt Readings from Last 3 Encounters:   05/14/19 211 lb (95.7 kg)   05/08/19 208 lb (94.3 kg)   05/03/19 210 lb 8.6 oz (95.5 kg)    Body mass index is 36.22 kg/m². Neck circumference: 16     BP HR SaO2   BP Readings from Last 3 Encounters:   05/14/19 (!) 152/90   05/08/19 136/78   05/03/19 132/82    Pulse Readings from Last 3 Encounters:   05/14/19 77   05/08/19 84   05/03/19 80    SpO2 Readings from Last 3 Encounters:   05/14/19 96%   05/08/19 98%   05/03/19 97%        The mandibular molar Class :   []1 [x]2 []3      Mallampati I Airway Classification:   []1 []2 []3 [x]4        Physical Exam   Constitutional: No distress. HENT:   Mallampati class 4, moderate retrognathia and hypognathia , normal airflow in bilateral nostrils, no septum deviation , crowded oropharynx with low soft palate, high arched hard palate,1+ tonsils enlargement. Eyes: EOM are normal.   Neck: Neck supple. Cardiovascular: Normal rate, regular rhythm and normal heart sounds. Pulmonary/Chest: Effort normal and breath sounds normal. No respiratory distress. Musculoskeletal: She exhibits edema (1+ LE). Neurological: She is alert. Skin: Skin is warm. Psychiatric: She has a normal mood and affect. Nursing note and vitals reviewed. Assessment:   Obstructive Sleep Apnea/Hypopnea Syndrome, was intolerant to CPAP in 2013  Acute CHF last week. Diagnosis Orders   1. Obstructive sleep apnea  Sleep Study with PAP Titration   2. Essential hypertension  Sleep Study with PAP Titration   3.  H/O CHF  Sleep Study with PAP Titration     Plan: Patient was counseled about the pathophysiology of obstructive sleep apnea syndrome and the methods for evaluating its presence and severity. Patient was counseled to avoid driving and other potentially hazardous circumstances if the patient is experiencing excessive sleepiness. Treatment considerations include the use of nasal CPAP, oral dental appliance or a surgical intervention, which should be based on otolarygologic findings, In the meantime, the patient should be cautioned to avoid the use of alcohol or other depressant medications because of potential for increasing the duration and severity of apnea and cautioned regarding driving or operating and dangerous equipment if the patient is experiencing daytime sleepiness. .  Most likely treating the LAURA will have position impact on HTN/CHF control. We discussed the proportionality between weight and AHI. With 10% weight change, the AHI has a 27% proportionate change. With 20% weight change, the AHI has a 45-50% proportionate change. Orders Placed This Encounter   Procedures    Sleep Study with PAP Titration       Return in about 3 months (around 8/14/2019) for Reveiwing CPAP/BiPAP usage and compliance report and tro.     Marky Mcdonald MD  Medical Director - [facility]

## 2019-05-17 NOTE — PROGRESS NOTES
[address]  Cardiology Progress Note      Leidy Soto  1970, 51 y.o. Holly Samuel:      HPI:   This is a 51 y.o. female with combined systolic and diastolic heart failure, non ischemic cardiomyopathy, hypertension, LAURA, DM and tobacco use. Negative stress nuclear and cath in the remote past that was  \" normal.\" Admitted to [facility] 5/1/19-5/3/19 for evaluation of chest pain and dyspnea. She reported running out of medication. CXR revealed pulmonary edema. She was diagnosed and treated for acute on chronic heart failure; diuresed. ECO and stress test performed showing LVEF of 40% and intermediate to high risk scan. Saw Shadi TEE on 5/7/19    Comes for follow up of HF. Says she feels better and is trying to take all medication. PCP stopped Losartan - HCTZ and prescribed Losartan. Denies any chest pain, palpitations and dizziness. Past Medical History:   Diagnosis Date    CHF (congestive heart failure) (HCC)     Diabetes mellitus ([address]     Hypertension     Hypokalemia     Nicotine addiction     LAURA (obstructive sleep apnea)       Past Surgical History:   Procedure Laterality Date    CARPAL TUNNEL RELEASE        Family History   Problem Relation Age of Onset    Heart Failure Maternal Aunt       Social History     Tobacco Use    Smoking status: Current Every Day Smoker     Packs/day: 0.50     Types: Cigarettes    Smokeless tobacco: Never Used   Substance Use Topics    Alcohol use:  Yes     Alcohol/week: 0.0 - 0.6 oz     Comment: occasional    Drug use: No     No Known Allergies      Review of Systems -   Constitutional: Negative for weight gain/loss; malaise, fever  Respiratory: Negative for Asthma;  cough and hemoptysis  Cardiovascular: Negative for palpitations,dizziness   Gastrointestinal: Negative for abd.pain; constipation/diarrhea;    Genitourinary: Negative for stones; hematuria; frequency hesitancy  Integumentt: Negative for rash or  furosemide (LASIX) 40 MG tablet Take 1 tablet by mouth daily 180 tablet 5    aspirin 81 MG chewable tablet Take 1 tablet by mouth daily 30 tablet 3    atorvastatin (LIPITOR) 10 MG tablet Take 1 tablet by mouth daily 30 tablet 3    metFORMIN (GLUCOPHAGE) 500 MG tablet Take 500 mg by mouth 2 times daily (with meals)        Labs:   Lab Results   Component Value Date    HDL 34 05/02/2019    LDLCALC 81 05/02/2019    TRIG 137 05/02/2019         EKG: none today    Stress test: 5/2018  Negative treadmill exercise stress test.  The patient ex. for 7.11 min. on the Aftab protocol to achieve a HR of 150  which is 88 % of PMHR. No Chest pain or ischemic ST changes    ECHO: 5/1/19  Left ventricular cavity size is mildly dilated. There is mild conc. LVH. EF 40%. There is moderate diffuse hypokinesis. Grade II diastolic dysfunction. Mitral valve leaflets appear mildly thickened. robably mild mitral regurgitation. Regurgitant jet is not well visualized. No evidence of mitral stenosis. The left atrium is moderately dilated. Stress Test: 5/1/19     Malinda Balbuena is some breast tissue attenuation (artifact) on both stress and rest    images.    There is normal isotope uptake at stress and rest. There is no evidence of    myocardial ischemia or scar. TID of 0.97    Severely dilated LV with midly depressed systolic function.    Left ventricular ejection fraction of 40 %. The inferior, apical and septal    walls are more hypokinetic compared with other segments.    Overall findings represent a intermediate to high risk scan.         Peak HR:111 bpm                  HR response: Normal    Peak BP:161/88 mmHg              BP response: Normal    Predicted HR: 171 bpm            HR/BP product:16804    % of predicted HR: 65    Test duration: 1 min and 40 sec    Reason for termination:Completed        ECG Findings    Normal sinus rhythm.    No ischemic EKG changes.          ASSESSMENT AND PLAN:      Combined systolic / diastolic heart failure    EF 54% Diastolic dysfunction. Non ischemic  Compensated  Continue medical management    NYHA Class II-III    Essential Hypertension  BP is elevated today  Pt reports compliance with medication  Discussed the importance of compliance as well as daily activity and low sodium diet  Continue current medical management     LAURA  Scheduled for sleep study next month    DM  On Metformin     LE Edema   Improved  Continue daily walking   Low sodium diet/fluid restriction  On diuretic therapy    Tobacco Abuse  Trying to quit smoking      Follow up in 6 months      Thank you very much for allowing me to participate in the care of your patient. Please do not hesitate to contact me if you have any questions. Sincerely,    Berto Ortiz M.D  [facility], [address] [place] [address]  Ph: [phone]  Fax: [phone]    This note was scribed in the presence of Dr. Berto Ortiz MD by Figueroa Jose    Physician Attestation:  The scribes documentation has been prepared under my direction and personally reviewed by me in its entirety. I confirm that the note above accurately reflects all work, treatment, procedures, and medical decision making performed by me.

## 2019-05-21 ENCOUNTER — OFFICE VISIT (OUTPATIENT)
Dept: CARDIOLOGY CLINIC | Age: 49
End: 2019-05-21
Payer: COMMERCIAL

## 2019-05-21 VITALS
BODY MASS INDEX: 36.02 KG/M2 | HEART RATE: 86 BPM | OXYGEN SATURATION: 98 % | HEIGHT: 64 IN | WEIGHT: 211 LBS | DIASTOLIC BLOOD PRESSURE: 90 MMHG | SYSTOLIC BLOOD PRESSURE: 156 MMHG

## 2019-05-21 DIAGNOSIS — I50.42 CHRONIC COMBINED SYSTOLIC AND DIASTOLIC HEART FAILURE (HCC): Primary | ICD-10-CM

## 2019-05-21 DIAGNOSIS — R60.0 BILATERAL LEG EDEMA: ICD-10-CM

## 2019-05-21 DIAGNOSIS — I10 ESSENTIAL HYPERTENSION: ICD-10-CM

## 2019-05-21 DIAGNOSIS — Z72.0 TOBACCO ABUSE: ICD-10-CM

## 2019-05-21 PROCEDURE — 99214 OFFICE O/P EST MOD 30 MIN: CPT | Performed by: INTERNAL MEDICINE

## 2019-05-21 RX ORDER — LOSARTAN POTASSIUM 100 MG/1
100 TABLET ORAL DAILY
Qty: 90 TABLET | Refills: 1 | COMMUNITY
Start: 2019-05-21 | End: 2020-09-21 | Stop reason: ALTCHOICE

## 2019-05-21 NOTE — PATIENT INSTRUCTIONS
Patient Education        Heart-Healthy Diet: Care Instructions  Your Care Instructions    A heart-healthy diet has lots of vegetables, fruits, nuts, beans, and whole grains, and is low in salt. It limits foods that are high in saturated fat, such as meats, cheeses, and fried foods. It may be hard to change your diet, but even small changes can lower your risk of heart attack and heart disease. Follow-up care is a key part of your treatment and safety. Be sure to make and go to all appointments, and call your doctor if you are having problems. It's also a good idea to know your test results and keep a list of the medicines you take. How can you care for yourself at home? Watch your portions  · Learn what a serving is. A \"serving\" and a \"portion\" are not always the same thing. Make sure that you are not eating larger portions than are recommended. For example, a serving of pasta is ½ cup. A serving size of meat is 2 to 3 ounces. A 3-ounce serving is about the size of a deck of cards. Measure serving sizes until you are good at [place]" them. Keep in mind that restaurants often serve portions that are 2 or 3 times the size of one serving. · To keep your energy level up and keep you from feeling hungry, eat often but in smaller portions. · Eat only the number of calories you need to stay at a healthy weight. If you need to lose weight, eat fewer calories than your body burns (through exercise and other physical activity). Eat more fruits and vegetables  · Eat a variety of fruit and vegetables every day. Dark green, deep orange, red, or yellow fruits and vegetables are especially good for you. Examples include spinach, carrots, peaches, and berries. · Keep carrots, celery, and other veggies handy for snacks. Buy fruit that is in season and store it where you can see it so that you will be tempted to eat it. · Cook dishes that have a lot of veggies in them, such as stir-fries and soups.   Limit saturated and trans fat  · Read food labels, and try to avoid saturated and trans fats. They increase your risk of heart disease. Trans fat is found in many processed foods such as cookies and crackers. · Use olive or canola oil when you cook. Try cholesterol-lowering spreads, such as Benecol or Take Control. · Bake, broil, grill, or steam foods instead of frying them. · Choose lean meats instead of high-fat meats such as hot dogs and sausages. Cut off all visible fat when you prepare meat. · Eat fish, skinless poultry, and meat alternatives such as soy products instead of high-fat meats. Soy products, such as tofu, may be especially good for your heart. · Choose low-fat or fat-free milk and dairy products. Eat fish  · Eat at least two servings of fish a week. Certain fish, such as salmon and tuna, contain omega-3 fatty acids, which may help reduce your risk of heart attack. Eat foods high in fiber  · Eat a variety of grain products every day. Include whole-grain foods that have lots of fiber and nutrients. Examples of whole-grain foods include oats, whole wheat bread, and brown rice. · Buy whole-grain breads and cereals, instead of white bread or pastries. Limit salt and sodium  · Limit how much salt and sodium you eat to help lower your blood pressure. · Taste food before you salt it. Add only a little salt when you think you need it. With time, your taste buds will adjust to less salt. · Eat fewer snack items, fast foods, and other high-salt, processed foods. Check food labels for the amount of sodium in packaged foods. · Choose low-sodium versions of canned goods (such as soups, vegetables, and beans). Limit sugar  · Limit drinks and foods with added sugar. These include candy, desserts, and soda pop. Limit alcohol  · Limit alcohol to no more than 2 drinks a day for men and 1 drink a day for women. Too much alcohol can cause health problems. When should you call for help?   Watch closely for changes in your [employer], and be sure to contact your doctor if:    · You would like help planning heart-healthy meals. Where can you learn more? Go to https://chpepiceweb.[employer]. org and sign in to your [employer] account. Enter V137 in the Changers box to learn more about \"Heart-Healthy Diet: Care Instructions. \"     If you do not have an account, please click on the \"Sign Up Now\" link. Current as of: July 22, 2018  Content Version: 12.0  © 1372-8384 Healthwise, Incorporated. Care instructions adapted under license by [facility] ([facility]). If you have questions about a medical condition or this instruction, always ask your healthcare professional. [address] any warranty or liability for your use of this information.

## 2019-05-24 RX ORDER — FUROSEMIDE 40 MG/1
40 TABLET ORAL DAILY
Qty: 180 TABLET | Refills: 0 | Status: SHIPPED | OUTPATIENT
Start: 2019-05-24 | End: 2020-04-21 | Stop reason: SDUPTHER

## 2019-06-07 ENCOUNTER — HOSPITAL ENCOUNTER (OUTPATIENT)
Dept: SLEEP CENTER | Age: 49
Discharge: HOME | End: 2019-06-07
Payer: COMMERCIAL

## 2019-06-07 DIAGNOSIS — I10 ESSENTIAL HYPERTENSION: ICD-10-CM

## 2019-06-07 DIAGNOSIS — Z86.79 H/O CHF: ICD-10-CM

## 2019-06-07 DIAGNOSIS — G47.33 OBSTRUCTIVE SLEEP APNEA: ICD-10-CM

## 2019-06-07 PROCEDURE — 95811 POLYSOM 6/>YRS CPAP 4/> PARM: CPT | Performed by: PSYCHIATRY & NEUROLOGY

## 2019-06-07 PROCEDURE — 95811 POLYSOM 6/>YRS CPAP 4/> PARM: CPT

## 2019-06-12 ENCOUNTER — TELEPHONE (OUTPATIENT)
Dept: PULMONOLOGY | Age: 49
End: 2019-06-12

## 2019-06-24 ENCOUNTER — OFFICE VISIT (OUTPATIENT)
Dept: PHARMACY | Age: 49
End: 2019-06-24
Payer: COMMERCIAL

## 2019-06-24 VITALS
BODY MASS INDEX: 36.29 KG/M2 | HEART RATE: 79 BPM | SYSTOLIC BLOOD PRESSURE: 155 MMHG | DIASTOLIC BLOOD PRESSURE: 92 MMHG | WEIGHT: 211.4 LBS

## 2019-06-24 DIAGNOSIS — I50.42 CHRONIC COMBINED SYSTOLIC AND DIASTOLIC HEART FAILURE (HCC): Primary | ICD-10-CM

## 2019-06-24 PROCEDURE — 85610 PROTHROMBIN TIME: CPT

## 2019-06-24 PROCEDURE — 99211 OFF/OP EST MAY X REQ PHY/QHP: CPT

## 2019-06-24 NOTE — PATIENT INSTRUCTIONS
1. You will need to call to schedule a follow up appointment with Dr. Kizzy Ruby for November. [phone]    HEART FAILURE:    With heart failure you must follow up with your Cardiologist, your PCP and other physicians as appropriate. Especially 7 days after a hospitalization and then as directed. Please call right away with any signs or symptoms of heart failure or other medical conditions that need attention or with any questions at all. Please call your Cardiologist or your PCP or the [facility] at [phone]. We can help manage these symptoms and often prevent a visit to the Emergency Room or hospitalization. * Know your dry weight (your weight without any fluid on board) = 210    * Call any time you have questions about anything. Do not wait. * It is very important to take your medications as prescribed, do not miss any doses. Call for refills before you run out. Typically when you have one week left. If you have trouble getting your medications for any reason, call us at [phone]. * Avoid NSAIDs (non steroidal anti inflammatory drugs) like Aleve (naproxen), Advil and Motrin (ibuprofen), Mobic (diclofenac), Celebrex (celecoxib) and aspirin. A daily aspirin is okay if recommended by your physician. It is okay to take Tylenol (acetaminophen) unless your physician has told you    otherwise. * Limit fluid intake. Typically this is no more than 1,500-2,000 milliliters (mL) per day. This is a liter and a half to two liters. This is equal to approximately 48-64 ounces (oz) per day    * Limit sodium intake. Typically this is no more than 2,000-2,400 milligrams (mg) per day. You will need to add up the sodium content found on the nutrition label for the foods you eat each day. * Weigh yourself every day. Weigh yourself before breakfast and after you go to the restroom. Wear the same thing each time you weigh yourself.    Keep a log and bring

## 2019-06-25 NOTE — PROGRESS NOTES
[facility]  Pharmacist  Heart Failure    [address]  Phone: [phone]  Fax: [phone]      NAME: [address] RECORD NUMBER:  [id number]  AGE: 51 y.o. GENDER: female  : 1970  EPISODE DATE:  2019      [facility] referral by Dr. Ilsa Mendoza / Dewey Salmon CNP    Dry weight: 210 pounds     ECHO EF%: 40 Date: 19       Subjective   Ms. Morris Acosta is a 51 y.o. female here for the Heart Failure Services. They are here today for a comprehensive medication review including over the counter medications and herbal products, overall wellbeing assessment, transition of care and any needed adjustments with updates and recommendations communicated to the referring physician. Functional Activity New York Heart Association Classification  Patient Symptoms   []   Class I No limitation of physical activity. Ordinary physical activity does not cause undue fatigue, palpitation, dyspnea (shortness of breath). [x]   Class II Slight limitation of physical activity. Comfortable at rest. Ordinary physical activity results in fatigue, palpitation, dyspnea (shortness of breath). []   Class III  Mahamed limitation of physical activity. Comfortable at rest.  Less than ordinary activity causes fatigue, palpitation, dyspnea. []   Class IV Unable to carry on any physical activity without discomfort. Symptoms of heart failure at rest.  If any physical activity is undertaken, discomfort increases.     Shortness of Breath:   [x]   None   []   Dyspnea on exertion   []   Dyspnea with normal activities  []   Dyspnea at rest  []   Dyspnea while sleeping    Patient Findings:   []  Missed doses  []  Diet changes  []  Sodium intake changes    []  Alcohol intake changes  []  Night time cough  []  Edema    []  Activity changes   []  Fatigue that limits activity []  Acute illness  []  Early saiety, abd fullness []  Chest pain   [x]  Other    Comments:  She denies SOB, orthopnea and edema. She does have itching, acne, dark patches of skin, and her hair has been breaking off. Objective / Assessment     Patient Active Problem List   Diagnosis Code    Acute systolic heart failure ([facility]) I50.21    Atypical chest pain R07.89    Essential hypertension I10    Type 2 diabetes mellitus with hyperglycemia, without long-term current use of insulin ([facility]) E11.65    Acute decompensated heart failure ([facility]) I50.9    LVH (left ventricular hypertrophy) due to hypertensive disease, with heart failure ([facility]) I11.0    Cigarette nicotine dependence without complication J65.043    Chest pain R07.9    Xerosis of skin L85.3    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    Urine abnormality R82.90    Tobacco use disorder F17.200    Contact dermatitis L25.9    Seasonal and perennial allergic rhinitis J30.89, J30.2    Rhinosinusitis J32.9    Other chronic allergic conjunctivitis H10.45    Obstructive sleep apnea G47.33    Hypokalemia E87.6    Fatigue R53.83    Hoarseness R49.0    Contraception Z78.9    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    Encounter for long-term (current) use of medications Z79.899    Esophageal reflux K21.9    Chronic rhinitis J31.0    Cervical radiculopathy M54.12    Carpal tunnel syndrome, bilateral G56.03    Carpal tunnel syndrome G56.00    Asymptomatic varicose veins I83.90    Acne vulgaris L70.0    Abnormal mammogram R92.8     Social History     Tobacco Use    Smoking status: Current Every Day Smoker     Packs/day: 0.50     Types: Cigarettes    Smokeless tobacco: Never Used   Substance Use Topics    Alcohol use:  Yes     Alcohol/week: 0.0 - 0.6 oz     Comment: occasional    Drug use: No         BMP:   Lab Results   Component Value Date     05/03/2019    K 3.2 05/03/2019    K 3.6 08/13/2018    CL 94 05/03/2019    CO2 33 05/03/2019    BUN 17 05/03/2019    CREATININE 1.1 05/03/2019     Addressed abnormal BMP results: Yes. She reports potassium stopped and spironolactone added. If elevated serum creatinine, medications reviewed for appropriateness / dose adjustment / recommendations: WNL. CBC:    Lab Results   Component Value Date    WBC 8.0 05/03/2019    HGB 14.2 05/03/2019    HCT 42.5 05/03/2019     05/03/2019     Addressed abnormal CBC results: WNL. HgA1C:   Lab Results   Component Value Date    LABA1C 8.1 12/18/2017     Addressed elevated HgA1c: Yes. Misses her afternoon metformin quite a bit. I advised she could take both of her tablets together in the morning. If Diabetes / Prediabetes, receiving proper care/ follow up: Yes     TSH: (ref range 0.27 - 4.20 uIU/mL)  No results found for: TSH    Lipids:   Lab Results   Component Value Date    CHOL 142 05/02/2019    TRIG 137 05/02/2019    HDL 34 05/02/2019    LDLCALC 81 05/02/2019     Reviewed LDL and recommended therapy if appropriate: Yes. Ms. Morris Acosta is on a Statin Yes     ProBNP:   Lab Results   Component Value Date    PROBNP 905 05/03/2019    PROBNP 3,414 05/01/2019    PROBNP 1,218 11/25/2018       Reviewed need for labs this visit     BP (!) 155/92   Pulse 79   Wt 211 lb 6.4 oz (95.9 kg)   LMP 05/01/2018   BMI 36.29 kg/m²     BP Readings from Last 3 Encounters:   06/24/19 (!) 155/92   05/21/19 (!) 156/90   05/14/19 (!) 152/90       Addressed hypertension / hypotension: Yes    Wt Readings from Last 6 Encounters:   06/24/19 211 lb 6.4 oz (95.9 kg)   05/21/19 211 lb (95.7 kg)   05/14/19 211 lb (95.7 kg)   05/08/19 208 lb (94.3 kg)   05/03/19 210 lb 8.6 oz (95.5 kg)   11/25/18 213 lb (96.6 kg)       Addressed weight gain / loss with regards to heart failure: Yes. Stable. Recommended weight loss if appropriate: Yes.     Reviewed daily weight log and assessed self management skills    Encouraged daily weights and to call with increase of 3 pounds in one day or 5 pounds in one week or weight increased above dry weight      Discussed fluid restriction and sodium restriction     Encouraged smoking cessation and alcohol abstinence. She is contemplating quitting. Thinking of a quit date. I gave her some tips. Medications reviewed by the Pharmacist: Yes   [] compared patient's bottles with EPIC list  [x] patient did not bring medication bottles    Total Discrepancies: 2  Heart Failure Medication Discrepancies: 0      Medication Reconciliation Discrepancies:  1. Not taking Naproxen - -removed from list  2. Not taking potassium - removed from list. She reports this was stopped. Medication Reconciliation comments:   She reports she has trouble with being adherent to her medications. We talked about this. She reports it would be better to take them all in the morning and get it over with . Metformin is the only one she takes twice daily. She takes 500mg twice daily. I advised she could take these together. She will try this. She forgets that afternoon dose frequently. Current medications:  Outpatient Medications Marked as Taking for the 6/24/19 encounter (Office Visit) with ANTONY MCKEON SCHEDULE   Medication Sig Dispense Refill    furosemide (LASIX) 40 MG tablet TAKE 1 TABLET BY MOUTH DAILY 180 tablet 0    losartan (COZAAR) 100 MG tablet Take 1 tablet by mouth daily 90 tablet 1    Lancets 30G MISC Inject 1 Stick into the skin      olopatadine (PATANOL) 0.1 % ophthalmic solution Apply to eye As needed for allergy symptoms      nicotine (NICODERM CQ) 21 MG/24HR       metoprolol succinate (TOPROL XL) 100 MG extended release tablet Take 1 tablet by mouth daily 30 tablet 2    spironolactone (ALDACTONE) 25 MG tablet Take 1 tablet by mouth daily 30 tablet 3    albuterol sulfate HFA (PROAIR HFA) 108 (90 Base) MCG/ACT inhaler 1-2 puffs every 4 hours while awake for 7-10 days then PRN wheezing  Dispense with SPACER and Instruct on use. May sub Ventolin or Proventil as needed per [employer].  1 Inhaler 3    atorvastatin (LIPITOR) 10 MG tablet Take 1 tablet by mouth daily 30 tablet 3    metFORMIN (GLUCOPHAGE) 500 MG tablet Take 500 mg by mouth 2 times daily (with meals)          Reviewed heart failure medications including how they work and potential side effects. Advised patient to avoid NSAIDs. Reviewed medications for potential contraindications or warnings with heart failure. Yes    Get With The Guidelines  Ms. Georgie Lutz is on a Beta-Blocker for (HFrEF) (systolic) EF </= 39%  Yes    Ms. Georgie Lutz is on an Ace-Inhibitor / ARB / Entresto for (HFrEF) (systolic) EF </= 49% Yes      Ms. Georgie Lutz is on a Aldosterone Receptor Antagonist for (HFrEF) (systolic) EF </= 01% or EF </= 40% with MI (Okay to use if SCr </= 2.5mg/dL in men, SCr </= 2mg/dL in women; Potassium < 5.0meq/L) Yes      Ms. Georgie Lutz is on a Diuretic Yes    Additional visit notes:   She came with another female that may be a family member. She did not participate in the visit. Ms. Georgie Lutz was interested in the information given and was engaged with questions. She reports \"I am getting on track\" Seems motivated, but will need reinforcement and encouragement. She does admit that adherence to medication is a challenge. I gave a heart failure binder and provided extensive education including the information noted above. Plan     Action taken, including but not limited to:  [] assistance with medication refills, etc.  [] assistance with making appropriate appointments  [] kaz labs as appropriate  [x] other reviewed meds for side effects that she is describing. Furosemide does have skin reactions as a possibility. Losartan may cause itching and a rash. Metoprolol can cause itching, a rash and alopecia in <1% of patients. Spironolactone can cause skin reactions as well and alopecia. Will discuss at her next visit. [] call placed to physician to address concerns:  [] medication changes:   [x] no medication changes:      Follow up plan:   7/11/19    Recommendations / Notes to the physician:  none         Electronically signed by Yael Ferrera [facility], PharmD on 6/25/2019 at 5:20 PM

## 2019-07-10 ENCOUNTER — OFFICE VISIT (OUTPATIENT)
Dept: PHARMACY | Age: 49
End: 2019-07-10
Payer: COMMERCIAL

## 2019-07-10 VITALS
BODY MASS INDEX: 36.22 KG/M2 | SYSTOLIC BLOOD PRESSURE: 185 MMHG | WEIGHT: 211 LBS | DIASTOLIC BLOOD PRESSURE: 75 MMHG | HEART RATE: 75 BPM

## 2019-07-10 DIAGNOSIS — I50.42 CHRONIC COMBINED SYSTOLIC AND DIASTOLIC HEART FAILURE (HCC): Primary | ICD-10-CM

## 2019-07-10 DIAGNOSIS — I50.42 CHRONIC COMBINED SYSTOLIC AND DIASTOLIC HEART FAILURE (HCC): ICD-10-CM

## 2019-07-10 LAB
ANION GAP SERPL CALCULATED.3IONS-SCNC: 13 MMOL/L (ref 3–16)
BUN BLDV-MCNC: 12 MG/DL (ref 7–20)
CALCIUM SERPL-MCNC: 9.4 MG/DL (ref 8.3–10.6)
CHLORIDE BLD-SCNC: 104 MMOL/L (ref 99–110)
CO2: 27 MMOL/L (ref 21–32)
CREAT SERPL-MCNC: 0.8 MG/DL (ref 0.6–1.1)
GFR AFRICAN AMERICAN: >60
GFR NON-AFRICAN AMERICAN: >60
GLUCOSE BLD-MCNC: 146 MG/DL (ref 70–99)
POTASSIUM SERPL-SCNC: 4 MMOL/L (ref 3.5–5.1)
SODIUM BLD-SCNC: 144 MMOL/L (ref 136–145)

## 2019-07-10 PROCEDURE — 99213 OFFICE O/P EST LOW 20 MIN: CPT

## 2019-07-10 NOTE — PATIENT INSTRUCTIONS
liters. This is equal to approximately 48-64 ounces (oz) per day    * Limit sodium intake. Typically this is no more than 2,000-2,400 milligrams (mg) per day. You will need to add up the sodium content found on the nutrition label for the foods you eat each day. * Weigh yourself every day. Weigh yourself before breakfast and after you go to the restroom. Wear the same thing each time you weigh yourself. Keep a log and bring it to each visit. Call if you gain more than 3 pounds in one day. [phone]   Call if you gain more than 5 pounds in one week. [phone]   Call if your weight goes up above your dry weight. [phone]    * If you have hypertension (high blood pressure), you may want to check your blood pressure daily. Your blood pressure goal is less than 130/80 unless instructed differently by your physician. Keep a log and bring it to each visit. * Be sure to keep good control of your Diabetes     * Be sure to keep good control of your Cholesterol    * Eat a Heart healthy diet    * Be active. Follow an exercise plan that is reasonable for you. * If you smoke, work to stop smoking. Best to avoid alcohol.

## 2019-08-29 ENCOUNTER — OFFICE VISIT (OUTPATIENT)
Dept: SLEEP MEDICINE | Age: 49
End: 2019-08-29
Payer: COMMERCIAL

## 2019-08-29 VITALS
HEART RATE: 88 BPM | BODY MASS INDEX: 36.54 KG/M2 | RESPIRATION RATE: 16 BRPM | SYSTOLIC BLOOD PRESSURE: 160 MMHG | WEIGHT: 214 LBS | HEIGHT: 64 IN | DIASTOLIC BLOOD PRESSURE: 93 MMHG | OXYGEN SATURATION: 97 %

## 2019-08-29 DIAGNOSIS — Z99.89 OSA ON CPAP: Primary | ICD-10-CM

## 2019-08-29 DIAGNOSIS — Z99.89 DEPENDENCE ON OTHER ENABLING MACHINES AND DEVICES: ICD-10-CM

## 2019-08-29 DIAGNOSIS — G47.33 OSA ON CPAP: Primary | ICD-10-CM

## 2019-08-29 PROCEDURE — 99213 OFFICE O/P EST LOW 20 MIN: CPT | Performed by: PSYCHIATRY & NEUROLOGY

## 2019-08-29 ASSESSMENT — SLEEP AND FATIGUE QUESTIONNAIRES
HOW LIKELY ARE YOU TO NOD OFF OR FALL ASLEEP WHILE SITTING QUIETLY AFTER LUNCH WITHOUT ALCOHOL: 0
HOW LIKELY ARE YOU TO NOD OFF OR FALL ASLEEP WHILE SITTING INACTIVE IN A PUBLIC PLACE: 1
HOW LIKELY ARE YOU TO NOD OFF OR FALL ASLEEP WHILE WATCHING TV: 1
HOW LIKELY ARE YOU TO NOD OFF OR FALL ASLEEP WHEN YOU ARE A PASSENGER IN A CAR FOR AN HOUR WITHOUT A BREAK: 1
HOW LIKELY ARE YOU TO NOD OFF OR FALL ASLEEP WHILE SITTING AND READING: 2
HOW LIKELY ARE YOU TO NOD OFF OR FALL ASLEEP WHILE LYING DOWN TO REST IN THE AFTERNOON WHEN CIRCUMSTANCES PERMIT: 0
HOW LIKELY ARE YOU TO NOD OFF OR FALL ASLEEP IN A CAR, WHILE STOPPED FOR A FEW MINUTES IN TRAFFIC: 0
ESS TOTAL SCORE: 6
HOW LIKELY ARE YOU TO NOD OFF OR FALL ASLEEP WHILE SITTING AND TALKING TO SOMEONE: 1

## 2019-08-29 NOTE — PROGRESS NOTES
Ventolin or Proventil as needed per [employer]. 11/25/18   ALBA Cummins - CNP   atorvastatin (LIPITOR) 10 MG tablet Take 1 tablet by mouth daily 12/22/17   Warren Pascal MD   metFORMIN (GLUCOPHAGE) 500 MG tablet Take 500 mg by mouth 2 times daily (with meals)  8/26/17   Historical Provider, MD       Allergies as of 08/29/2019    (No Known Allergies)       Patient Active Problem List   Diagnosis    Acute systolic heart failure (HCC)    Atypical chest pain    Essential hypertension    Type 2 diabetes mellitus with hyperglycemia, without long-term current use of insulin (HCC)    Acute decompensated heart failure ([address]    LVH (left ventricular hypertrophy) due to hypertensive disease, with heart failure (HCC)    Cigarette nicotine dependence without complication    Chest pain    Xerosis of skin    Vocal cord polyps    Urine abnormality    Tobacco use disorder    Contact dermatitis    Seasonal and perennial allergic rhinitis    Rhinosinusitis    Other chronic allergic conjunctivitis    Obstructive sleep apnea    Hypokalemia    Fatigue    Hoarseness    Contraception    Dermatosis papulosa nigra    Encounter for long-term (current) use of medications    Esophageal reflux    Chronic rhinitis    Cervical radiculopathy    Carpal tunnel syndrome, bilateral    Carpal tunnel syndrome    Asymptomatic varicose veins    Acne vulgaris    Abnormal mammogram       Past Medical History:   Diagnosis Date    CHF (congestive heart failure) (HCC)     Diabetes mellitus ([address]     Hypertension     Hypokalemia     Nicotine addiction     LAURA (obstructive sleep apnea)        Past Surgical History:   Procedure Laterality Date    CARPAL TUNNEL RELEASE         Family History   Problem Relation Age of Onset    Heart Failure Maternal Aunt        Review of Systems   Constitutional: Fatigue: improved when she used the CPAP. Cardiovascular: Positive for leg swelling.    Genitourinary: Positive for

## 2019-09-09 ENCOUNTER — TELEPHONE (OUTPATIENT)
Dept: PULMONOLOGY | Age: 49
End: 2019-09-09

## 2019-09-10 ENCOUNTER — TELEPHONE (OUTPATIENT)
Dept: PULMONOLOGY | Age: 49
End: 2019-09-10

## 2019-12-12 RX ORDER — SPIRONOLACTONE 25 MG/1
25 TABLET ORAL DAILY
Qty: 90 TABLET | Refills: 3 | Status: SHIPPED | OUTPATIENT
Start: 2019-12-12

## 2019-12-13 ENCOUNTER — TELEPHONE (OUTPATIENT)
Dept: PHARMACY | Age: 49
End: 2019-12-13

## 2020-01-03 ENCOUNTER — SCHEDULED TELEPHONE ENCOUNTER (OUTPATIENT)
Dept: PHARMACY | Age: 50
End: 2020-01-03

## 2020-01-03 NOTE — TELEPHONE ENCOUNTER
I called patient she agreed to reschedule missed appointment in the [facility] for dietician and pharmacy visits.         [address] [facility]  Anticoagulation, COPD, Heart Failure, Diabetes Services  [phone]

## 2020-01-21 NOTE — PROGRESS NOTES
[facility]  Cardiology Progress Note      Dotti Boas  1970, 47 y.o. Tulio Gold:      HPI:   This is a 47 y.o. female with combined systolic and diastolic heart failure, non ischemic cardiomyopathy, hypertension, LAURA, DM and tobacco use. Negative stress nuclear and cath in the remote past that was  \" normal.\" Admitted to [facility] 5/1/19-5/3/19 for evaluation of chest pain and dyspnea. She reported running out of medication. CXR revealed pulmonary edema. She was diagnosed and treated for acute on chronic heart failure; diuresed. ECO and stress test performed showing LVEF of 40% and intermediate to high risk scan. Saw Heather TEE on 5/7/19          Past Medical History:   Diagnosis Date    CHF (congestive heart failure) (HCC)     Diabetes mellitus ([address]     Hypertension     Hypokalemia     Nicotine addiction     LAURA (obstructive sleep apnea)       Past Surgical History:   Procedure Laterality Date    CARPAL TUNNEL RELEASE        Family History   Problem Relation Age of Onset    Heart Failure Maternal Aunt       Social History     Tobacco Use    Smoking status: Current Every Day Smoker     Packs/day: 0.50     Types: Cigarettes    Smokeless tobacco: Never Used   Substance Use Topics    Alcohol use:  Yes     Alcohol/week: 0.0 - 1.0 standard drinks     Comment: occasional    Drug use: No     No Known Allergies      Review of Systems -   Constitutional: Negative for weight gain/loss; malaise, fever  Respiratory: Negative for Asthma;  cough and hemoptysis  Cardiovascular: Negative for palpitations,dizziness   Gastrointestinal: Negative for abd.pain; constipation/diarrhea;    Genitourinary: Negative for stones; hematuria; frequency hesitancy  Integumentt: Negative for rash or pruritis  Hematologic/lymphatic: Negative for blood dyscrasia; leukemia/lymphoma  Musculoskeletal: Negative for Connective tissue disease  Neurological:  Negative for Seizure   Behavioral/Psych:Negative for Bipolar disorder, Schizophrenia; Dementia  Endocrine: negative for thyroid, parathyroid disease    Physical Examination:    BP (!) 160/86 (Site: Right Upper Arm, Position: Sitting)   Pulse 90   Ht 5' 4\" (1.626 m)   Wt 205 lb (93 kg)   LMP 05/01/2018   SpO2 98%   BMI 35.19 kg/m²    HEENT:  Face: Atraumatic, Conjunctiva: Pink; non icteric,  Mucous Memb:  Moist, No thyromegaly or Lymphadenopathy  Respiratory:  Resp Assessment: normal, Resp Auscultation: clear  Cardiovascular: Auscultation: nl S1 & S2, Palpation:  Nl PMI; No heaves or thrills, JVP:  normal  Abdomen: Soft, non-tender, Normal bowel sounds,  No organomegaly  Extremities: No Cyanosis or Clubbing  Neurological: Oriented to time, place, and person, Non-anxious  Psychiatric: Normal mood and affect  Skin: Warm and dry,  No rash seen     Outpatient Medications Marked as Taking for the 1/22/20 encounter (Office Visit) with Myrtle Villaseñor MD   Medication Sig Dispense Refill    spironolactone (ALDACTONE) 25 MG tablet TAKE 1 TABLET BY MOUTH DAILY 90 tablet 3    furosemide (LASIX) 40 MG tablet TAKE 1 TABLET BY MOUTH DAILY 180 tablet 0    losartan (COZAAR) 100 MG tablet Take 1 tablet by mouth daily 90 tablet 1    Lancets 30G MISC Inject 1 Stick into the skin      olopatadine (PATANOL) 0.1 % ophthalmic solution Apply to eye As needed for allergy symptoms      metoprolol succinate (TOPROL XL) 100 MG extended release tablet Take 1 tablet by mouth daily 30 tablet 2    albuterol sulfate HFA (PROAIR HFA) 108 (90 Base) MCG/ACT inhaler 1-2 puffs every 4 hours while awake for 7-10 days then PRN wheezing  Dispense with SPACER and Instruct on use. May sub Ventolin or Proventil as needed per [employer].  1 Inhaler 3    atorvastatin (LIPITOR) 10 MG tablet Take 1 tablet by mouth daily 30 tablet 3    metFORMIN (GLUCOPHAGE) 500 MG tablet Take 500 mg by mouth 2 times daily (with meals)        Labs:   Lab Results   Component Value Date    HDL 34 05/02/2019    LDLCALC 81 2019    TRIG 137 2019       EK20, reviewed     Stress test: 2018  Negative treadmill exercise stress test.  The patient ex. for 7.11 min. on the Aftab protocol to achieve a HR of 150  which is 88 % of PMHR. No Chest pain or ischemic ST changes    ECHO: 19  Left ventricular cavity size is mildly dilated. There is mild conc. LVH. EF 40%. There is moderate diffuse hypokinesis. Grade II diastolic dysfunction. Mitral valve leaflets appear mildly thickened. robably mild mitral regurgitation. Regurgitant jet is not well visualized. No evidence of mitral stenosis. The left atrium is moderately dilated. Stress Test: 19     Aquiles Argue is some breast tissue attenuation (artifact) on both stress and rest    images.    There is normal isotope uptake at stress and rest. There is no evidence of    myocardial ischemia or scar. TID of 0.97    Severely dilated LV with midly depressed systolic function.    Left ventricular ejection fraction of 40 %. The inferior, apical and septal    walls are more hypokinetic compared with other segments.    Overall findings represent a intermediate to high risk scan.         Peak HR:111 bpm                  HR response: Normal    Peak BP:161/88 mmHg              BP response: Normal    Predicted HR: 171 bpm            HR/BP product:74633    % of predicted HR: 65    Test duration: 1 min and 40 sec    Reason for termination:Completed        ECG Findings    Normal sinus rhythm.    No ischemic EKG changes. ASSESSMENT AND PLAN:      Combined systolic / diastolic heart failure    EF 13% Diastolic dysfunction.   Non ischemic  Compensated  Continue medical management    NYHA Class II-III    Essential Hypertension  BP elevated  Patient did not take medication today  Again, discussed non compliance and the importance of taking medication     LAURA  Wearing CPAP    DM  Takes Metformin     LE Edema   Improved     Tobacco Abuse  Started smoking again  Smokes 0.5 ppd  Again, discussed smoking cessation       Noncompliance is a major issue: Spent a lot of time educating her about consequences of untreated severe hypertension heart failure        Follow up in 6 months      Thank you very much for allowing me to participate in the care of your patient. Please do not hesitate to contact me if you have any questions. Sincerely,    Tino Leon M.D  [facility], [address] [place] [address]  Ph: [phone]  Fax: [phone]    This note was scribed in the presence of Dr. Tino Leon MD by Rosezella Litten    Physician Attestation:  The scribes documentation has been prepared under my direction and personally reviewed by me in its entirety. I confirm that the note above accurately reflects all work, treatment, procedures, and medical decision making performed by me.

## 2020-01-22 ENCOUNTER — OFFICE VISIT (OUTPATIENT)
Dept: CARDIOLOGY CLINIC | Age: 50
End: 2020-01-22
Payer: COMMERCIAL

## 2020-01-22 VITALS
DIASTOLIC BLOOD PRESSURE: 86 MMHG | OXYGEN SATURATION: 98 % | WEIGHT: 205 LBS | BODY MASS INDEX: 35 KG/M2 | HEIGHT: 64 IN | SYSTOLIC BLOOD PRESSURE: 160 MMHG | HEART RATE: 90 BPM

## 2020-01-22 PROCEDURE — 93000 ELECTROCARDIOGRAM COMPLETE: CPT | Performed by: INTERNAL MEDICINE

## 2020-01-22 PROCEDURE — 99213 OFFICE O/P EST LOW 20 MIN: CPT | Performed by: INTERNAL MEDICINE

## 2020-01-22 NOTE — PATIENT INSTRUCTIONS
Patient Education        Shortness of Breath: Care Instructions  Your Care Instructions  Shortness of breath has many causes. Sometimes conditions such as anxiety can lead to shortness of breath. Some people get mild shortness of breath when they exercise. Trouble breathing also can be a symptom of a serious problem, such as asthma, lung disease, emphysema, heart problems, and pneumonia. If your shortness of breath continues, you may need tests and treatment. Watch for any changes in your breathing and other symptoms. Follow-up care is a key part of your treatment and safety. Be sure to make and go to all appointments, and call your doctor if you are having problems. It's also a good idea to know your test results and keep a list of the medicines you take. How can you care for yourself at home? · Do not smoke or allow others to smoke around you. If you need help quitting, talk to your doctor about stop-smoking programs and medicines. These can increase your chances of quitting for good. · Get plenty of rest and sleep. · Take your medicines exactly as prescribed. Call your doctor if you think you are having a problem with your medicine. · Find healthy ways to deal with stress. ? Exercise daily. ? Get plenty of sleep. ? Eat regularly and well. When should you call for help? Call 911 anytime you think you may need emergency care. For example, call if:    · You have severe shortness of breath.     · You have symptoms of a heart attack. These may include:  ? Chest pain or pressure, or a strange feeling in the chest.  ? Sweating. ? Shortness of breath. ? Nausea or vomiting. ? Pain, pressure, or a strange feeling in the back, neck, jaw, or upper belly or in one or both shoulders or arms. ? Lightheadedness or sudden weakness. ? A fast or irregular heartbeat. After you call  911, the  may tell you to chew 1 adult-strength or 2 to 4 low-dose aspirin. Wait for an ambulance.  Do not try to drive 2019  Content Version: 12.3  © 4347-2839 Healthwise, Incorporated. Care instructions adapted under license by [facility] ([facility]). If you have questions about a medical condition or this instruction, always ask your healthcare professional. [address] any warranty or liability for your use of this information.

## 2020-01-24 ENCOUNTER — SCHEDULED TELEPHONE ENCOUNTER (OUTPATIENT)
Dept: PHARMACY | Age: 50
End: 2020-01-24

## 2020-01-24 NOTE — TELEPHONE ENCOUNTER
[facility]  Heart Failure Service  [phone]     NAME: [address] RECORD NUMBER:  [id number]. Follow up phone call:      I called and left another message for patient to please call back if interested in rescheduling an appointment she has missed the last 4 appointments. We last saw her in office in July of 2019. PLAN:   We will discharge her from our services she can call back to schedule if needed.      [facility]  Heart Failure Service  [phone]

## 2020-04-17 ENCOUNTER — TELEPHONE (OUTPATIENT)
Dept: CARDIOLOGY CLINIC | Age: 50
End: 2020-04-17

## 2020-04-17 NOTE — TELEPHONE ENCOUNTER
Chest pain    Please ask patient to describe the type of pain she is experiencing. Is it a sharp stabbing pain, shooting, throbbing, or pressrure. When she gets chest pain does she have associated symptoms during those times. Does she become short of breath with the chest pain or feel nauseous. How long does the chest pain last.   Does the pain radiate. When it occurs with activity does it subside with rest.    Please advise patient to reduce caffeine consumption as it can lead to an increase in palpations.

## 2020-04-17 NOTE — TELEPHONE ENCOUNTER
Dr. Corey Riley    Patient last seen in office with Dr. Angeles Hurtado on 1/22/20. She did previously experience chest pain 5/2019 and underwent stress testing. This was abnormal and thought to be breast attenuation but was stated to be intermediate to high risk scan. Please see message below regarding current symptoms of chest pain and advise.

## 2020-04-17 NOTE — TELEPHONE ENCOUNTER
Returned call to Dora Patterson 171. Patient said yesterday she was rushing and other times she was resting. Palpitations at rest.  Does consume  caffeine, nicotine and chocolate. SOB with agitation and rushing. Patient does not have nitro. State she has been more compliant with her medications the last week or so. Especially due to Coronavirus, she wants to avoid going to the hospital.      Last OV 1/22/20. DX: CHF, HTN, LAURA, DM, JENNI, Tobacco abuse.    Noncompliance is a major issue: Spent a lot of time educating her about consequences of untreated severe hypertension heart failure

## 2020-04-20 DIAGNOSIS — R07.9 CHEST PAIN ON EXERTION: ICD-10-CM

## 2020-04-20 LAB
ANION GAP SERPL CALCULATED.3IONS-SCNC: 10 MMOL/L (ref 3–16)
BUN BLDV-MCNC: 13 MG/DL (ref 7–20)
CALCIUM SERPL-MCNC: 9.5 MG/DL (ref 8.3–10.6)
CHLORIDE BLD-SCNC: 103 MMOL/L (ref 99–110)
CO2: 29 MMOL/L (ref 21–32)
CREAT SERPL-MCNC: 0.8 MG/DL (ref 0.6–1.1)
GFR AFRICAN AMERICAN: >60
GFR NON-AFRICAN AMERICAN: >60
GLUCOSE BLD-MCNC: 153 MG/DL (ref 70–99)
HCT VFR BLD CALC: 43.6 % (ref 36–48)
HEMOGLOBIN: 14.2 G/DL (ref 12–16)
MCH RBC QN AUTO: 25.2 PG (ref 26–34)
MCHC RBC AUTO-ENTMCNC: 32.7 G/DL (ref 31–36)
MCV RBC AUTO: 77.1 FL (ref 80–100)
PDW BLD-RTO: 16.7 % (ref 12.4–15.4)
PLATELET # BLD: 287 K/UL (ref 135–450)
PMV BLD AUTO: 10.1 FL (ref 5–10.5)
POTASSIUM SERPL-SCNC: 3.9 MMOL/L (ref 3.5–5.1)
RBC # BLD: 5.65 M/UL (ref 4–5.2)
SODIUM BLD-SCNC: 142 MMOL/L (ref 136–145)
WBC # BLD: 9 K/UL (ref 4–11)

## 2020-04-21 ENCOUNTER — HOSPITAL ENCOUNTER (OUTPATIENT)
Dept: CARDIAC CATH/INVASIVE PROCEDURES | Age: 50
Discharge: HOME | End: 2020-04-21
Payer: COMMERCIAL

## 2020-04-21 VITALS — HEART RATE: 90 BPM | DIASTOLIC BLOOD PRESSURE: 83 MMHG | SYSTOLIC BLOOD PRESSURE: 165 MMHG | RESPIRATION RATE: 15 BRPM

## 2020-04-21 PROBLEM — I20.8 ANGINA AT REST (HCC): Status: ACTIVE | Noted: 2019-05-01

## 2020-04-21 PROBLEM — I20.89 ANGINA AT REST: Status: ACTIVE | Noted: 2019-05-01

## 2020-04-21 LAB
EKG ATRIAL RATE: 85 BPM
EKG DIAGNOSIS: NORMAL
EKG P AXIS: 77 DEGREES
EKG P-R INTERVAL: 174 MS
EKG Q-T INTERVAL: 392 MS
EKG QRS DURATION: 80 MS
EKG QTC CALCULATION (BAZETT): 466 MS
EKG R AXIS: 57 DEGREES
EKG T AXIS: 3 DEGREES
EKG VENTRICULAR RATE: 85 BPM

## 2020-04-21 PROCEDURE — 6360000002 HC RX W HCPCS

## 2020-04-21 PROCEDURE — 99215 OFFICE O/P EST HI 40 MIN: CPT | Performed by: INTERNAL MEDICINE

## 2020-04-21 PROCEDURE — 93005 ELECTROCARDIOGRAM TRACING: CPT | Performed by: INTERNAL MEDICINE

## 2020-04-21 PROCEDURE — 2500000003 HC RX 250 WO HCPCS

## 2020-04-21 PROCEDURE — 93458 L HRT ARTERY/VENTRICLE ANGIO: CPT

## 2020-04-21 PROCEDURE — 99152 MOD SED SAME PHYS/QHP 5/>YRS: CPT | Performed by: INTERNAL MEDICINE

## 2020-04-21 PROCEDURE — 99152 MOD SED SAME PHYS/QHP 5/>YRS: CPT

## 2020-04-21 PROCEDURE — 99153 MOD SED SAME PHYS/QHP EA: CPT

## 2020-04-21 PROCEDURE — 93010 ELECTROCARDIOGRAM REPORT: CPT | Performed by: INTERNAL MEDICINE

## 2020-04-21 PROCEDURE — 6360000004 HC RX CONTRAST MEDICATION: Performed by: INTERNAL MEDICINE

## 2020-04-21 PROCEDURE — 2709999900 HC NON-CHARGEABLE SUPPLY

## 2020-04-21 PROCEDURE — 93458 L HRT ARTERY/VENTRICLE ANGIO: CPT | Performed by: INTERNAL MEDICINE

## 2020-04-21 PROCEDURE — C1769 GUIDE WIRE: HCPCS

## 2020-04-21 PROCEDURE — C1894 INTRO/SHEATH, NON-LASER: HCPCS

## 2020-04-21 RX ORDER — SODIUM CHLORIDE 0.9 % (FLUSH) 0.9 %
10 SYRINGE (ML) INJECTION PRN
Status: DISCONTINUED | OUTPATIENT
Start: 2020-04-21 | End: 2020-04-21 | Stop reason: ALTCHOICE

## 2020-04-21 RX ORDER — ACETAMINOPHEN 325 MG/1
650 TABLET ORAL EVERY 4 HOURS PRN
Status: DISCONTINUED | OUTPATIENT
Start: 2020-04-21 | End: 2020-04-22 | Stop reason: HOSPADM

## 2020-04-21 RX ORDER — ASPIRIN 325 MG
325 TABLET ORAL ONCE
Status: DISCONTINUED | OUTPATIENT
Start: 2020-04-21 | End: 2020-04-21 | Stop reason: ALTCHOICE

## 2020-04-21 RX ORDER — 0.9 % SODIUM CHLORIDE 0.9 %
500 INTRAVENOUS SOLUTION INTRAVENOUS PRN
Status: DISCONTINUED | OUTPATIENT
Start: 2020-04-21 | End: 2020-04-22 | Stop reason: HOSPADM

## 2020-04-21 RX ORDER — MORPHINE SULFATE 2 MG/ML
2 INJECTION, SOLUTION INTRAMUSCULAR; INTRAVENOUS
Status: ACTIVE | OUTPATIENT
Start: 2020-04-21 | End: 2020-04-21

## 2020-04-21 RX ORDER — SODIUM CHLORIDE 0.9 % (FLUSH) 0.9 %
10 SYRINGE (ML) INJECTION EVERY 12 HOURS SCHEDULED
Status: DISCONTINUED | OUTPATIENT
Start: 2020-04-21 | End: 2020-04-22 | Stop reason: HOSPADM

## 2020-04-21 RX ORDER — SODIUM CHLORIDE 9 MG/ML
INJECTION, SOLUTION INTRAVENOUS CONTINUOUS
Status: DISCONTINUED | OUTPATIENT
Start: 2020-04-21 | End: 2020-04-21 | Stop reason: ALTCHOICE

## 2020-04-21 RX ORDER — FUROSEMIDE 40 MG/1
40 TABLET ORAL 2 TIMES DAILY
Qty: 180 TABLET | Refills: 0 | Status: SHIPPED | OUTPATIENT
Start: 2020-04-21 | End: 2020-09-21

## 2020-04-21 RX ORDER — SODIUM CHLORIDE 0.9 % (FLUSH) 0.9 %
10 SYRINGE (ML) INJECTION PRN
Status: DISCONTINUED | OUTPATIENT
Start: 2020-04-21 | End: 2020-04-22 | Stop reason: HOSPADM

## 2020-04-21 RX ORDER — FENTANYL CITRATE 50 UG/ML
25 INJECTION, SOLUTION INTRAMUSCULAR; INTRAVENOUS
Status: ACTIVE | OUTPATIENT
Start: 2020-04-21 | End: 2020-04-21

## 2020-04-21 RX ORDER — ATROPINE SULFATE 0.4 MG/ML
0.5 AMPUL (ML) INJECTION
Status: ACTIVE | OUTPATIENT
Start: 2020-04-21 | End: 2020-04-21

## 2020-04-21 RX ORDER — ONDANSETRON 2 MG/ML
4 INJECTION INTRAMUSCULAR; INTRAVENOUS EVERY 6 HOURS PRN
Status: DISCONTINUED | OUTPATIENT
Start: 2020-04-21 | End: 2020-04-22 | Stop reason: HOSPADM

## 2020-04-21 RX ORDER — MIDAZOLAM HYDROCHLORIDE 1 MG/ML
2 INJECTION INTRAMUSCULAR; INTRAVENOUS
Status: ACTIVE | OUTPATIENT
Start: 2020-04-21 | End: 2020-04-21

## 2020-04-21 RX ORDER — SODIUM CHLORIDE 0.9 % (FLUSH) 0.9 %
10 SYRINGE (ML) INJECTION EVERY 12 HOURS SCHEDULED
Status: DISCONTINUED | OUTPATIENT
Start: 2020-04-21 | End: 2020-04-21 | Stop reason: ALTCHOICE

## 2020-04-21 RX ADMIN — IOPAMIDOL 69 ML: 755 INJECTION, SOLUTION INTRAVENOUS at 11:46

## 2020-04-21 NOTE — H&P
Behavioral/Psych:Negative for Bipolar disorder, Schizophrenia; Dementia  Endocrine: negative for thyroid, parathyroid disease    No intake or output data in the 24 hours ending 04/21/20 1003     Physical Examination:    LMP 05/01/2018    There were no vitals filed for this visit. Wt Readings from Last 3 Encounters:   01/22/20 205 lb (93 kg)   08/29/19 214 lb (97.1 kg)   07/10/19 211 lb (95.7 kg)      BP Readings from Last 3 Encounters:   01/22/20 (!) 160/86   08/29/19 (!) 160/93   07/10/19 (!) 185/75         HEENT:  Face: Atraumatic, Conjunctiva: Pink; non icteric,  Mucous Memb:  Moist, No thyromegaly or Lymphadenopathy  Respiratory:  Resp Assessment: normal, Resp Auscultation: clear   Cardiovascular: Auscultation: nl S1 & S2, Palpation:  Nl PMI; No heaves or thrills, JVP:  normal  Abdomen: Soft, non-tender, Normal bowel sounds,  No organomegaly  Extremities: No Cyanosis or Clubbing  Neurological: Oriented to time, place, and person, Non-anxious  Psychiatric: Normal mood and affect  Skin: Warm and dry,  No rash seen    Current Facility-Administered Medications: 0.9 % sodium chloride infusion, , Intravenous, Continuous  sodium chloride flush 0.9 % injection 10 mL, 10 mL, Intravenous, 2 times per day  sodium chloride flush 0.9 % injection 10 mL, 10 mL, Intravenous, PRN  aspirin tablet 325 mg, 325 mg, Oral, Once       Labs:   Recent Labs     04/20/20  1335   WBC 9.0   HGB 14.2   HCT 43.6        Recent Labs     04/20/20  1335      K 3.9   CO2 29   BUN 13   CREATININE 0.8   LABRCNT(BNP:2)@  No results for input(s): PROTIME, INR in the last 72 hours. No results for input(s): APTT in the last 72 hours. No results for input(s): CKTOTAL, CKMB, CKMBINDEX, TROPONINI in the last 72 hours. Lab Results   Component Value Date    HDL 34 05/02/2019    LDLCALC 81 05/02/2019    TRIG 137 05/02/2019     No results for input(s): AST, ALT, LABALBU in the last 72 hours.     EKG:         Chest X-Ray:       [id number] Left ventricular cavity size is mildly dilated. There is mild concentric   left ventricular hypertrophy. Overall left ventricular systolic function   appears moderately reduced. Ejection fraction is visually estimated to be   40%. There is moderate diffuse hypokinesis. Diastolic filling parameters   suggest grade II diastolic dysfunction. Mitral valve leaflets appear mildly thickened. Probably mild mitral regurgitation. Regurgitant jet is not well visualized. No evidence of mitral stenosis. The left atrium is moderately dilated. Stress Nuclear: 5/2019 March Bame is some breast tissue attenuation (artifact) on both stress and rest    images.    There is normal isotope uptake at stress and rest. There is no evidence of    myocardial ischemia or scar. TID of 0.97    Severely dilated LV with midly depressed systolic function.    Left ventricular ejection fraction of 40 %. The inferior, apical and septal    walls are more hypokinetic compared with other segments.    Overall findings represent a intermediate to high risk scan. ASSESSMENT AND PLAN:      Chest tightness with shortness of breath occurring at rest  Symptoms sound atypical to me  Previous stress test have been negative  Has multiple risk factors for coronary disease  I believe is appropriate for her to have a coronary angiogram to rule out CAD  Procedure and risks explained to the patient who understands and wishes to proceed.       Alex Wagner M.D  4/21/2020

## 2020-04-21 NOTE — PRE SEDATION
tablet 2    albuterol sulfate HFA (PROAIR HFA) 108 (90 Base) MCG/ACT inhaler 1-2 puffs every 4 hours while awake for 7-10 days then PRN wheezing  Dispense with SPACER and Instruct on use. May sub Ventolin or Proventil as needed per [employer]. 1 Inhaler 3    atorvastatin (LIPITOR) 10 MG tablet Take 1 tablet by mouth daily 30 tablet 3    metFORMIN (GLUCOPHAGE) 500 MG tablet Take 500 mg by mouth 2 times daily (with meals)        Current Facility-Administered Medications   Medication Dose Route Frequency Provider Last Rate Last Dose    0.9 % sodium chloride infusion   Intravenous Continuous Kerri Kocher, MD        sodium chloride flush 0.9 % injection 10 mL  10 mL Intravenous 2 times per day Kerri Kocher, MD        sodium chloride flush 0.9 % injection 10 mL  10 mL Intravenous PRN Kerri Kocher, MD        aspirin tablet 325 mg  325 mg Oral Once Kerri Kocher, MD               Pre-Sedation:    Pre-Sedation Documentation and Exam:  I have personally completed a history, physical exam & review of systems for this patient (see notes). Prior History of Anesthesia Complications:   none    Modified Mallampati:  I (soft palate, uvula, fauces, tonsillar pillars visible)    ASA Classification:  Class 2 - A normal healthy patient with mild systemic disease      Lucia Scale: Activity:  2 - Able to move 4 extremities voluntarily on command  Respiration:  2 - Able to breathe deeply and cough freely  Circulation:  2 - BP+/- 20mmHg of normal  Consciousness:  2 - Fully awake  Oxygen Saturation (color):  2 - Able to maintain oxygen saturation >92% on room air    Sedation/Anesthesia Plan:  Guard the patient's safety and welfare. Minimize physical discomfort and pain. Minimize negative psychological responses to treatment by providing sedation and analgesia and maximize the potential amnesia. Patient to meet pre-procedure discharge plan.     Medication Planned:  midazolam intravenously and fentanyl intravenously    Patient is an

## 2020-07-29 RX ORDER — METOPROLOL SUCCINATE 100 MG/1
100 TABLET, EXTENDED RELEASE ORAL DAILY
Qty: 30 TABLET | Refills: 5 | Status: SHIPPED | OUTPATIENT
Start: 2020-07-29 | End: 2020-09-21

## 2020-09-18 NOTE — PROGRESS NOTES
Types: Cigarettes    Smokeless tobacco: Never Used   Substance Use Topics    Alcohol use: Yes     Alcohol/week: 0.0 - 1.0 standard drinks     Comment: occasional    Drug use: No     No Known Allergies      Review of Systems -   Constitutional: Negative for weight gain/loss; malaise, fever  Respiratory: Negative for Asthma;  cough and hemoptysis  Cardiovascular: Negative for palpitations,dizziness   Gastrointestinal: Negative for abd.pain; constipation/diarrhea;    Genitourinary: Negative for stones; hematuria; frequency hesitancy  Integumentt: Negative for rash or pruritis  Hematologic/lymphatic: Negative for blood dyscrasia; leukemia/lymphoma  Musculoskeletal: Negative for Connective tissue disease  Neurological:  Negative for Seizure   Behavioral/Psych:Negative for Bipolar disorder, Schizophrenia; Dementia  Endocrine: negative for thyroid, parathyroid disease    Physical Examination:    BP (!) 180/83   Pulse 85   Temp 97 °F (36.1 °C)   Ht 5' 4\" (1.626 m)   Wt 207 lb 6.4 oz (94.1 kg) Comment: with shoes  LMP 05/01/2018   SpO2 96%   BMI 35.60 kg/m²    HEENT:  Face: Atraumatic, Conjunctiva: Pink; non icteric,  Mucous Memb:  Moist, No thyromegaly or Lymphadenopathy  Respiratory:  Resp Assessment: normal, Resp Auscultation: clear  Cardiovascular: Auscultation: nl S1 & S2, Palpation:  Nl PMI;  No heaves or thrills, JVP:  normal  Abdomen: Soft, non-tender, Normal bowel sounds,  No organomegaly  Extremities: No Cyanosis or Clubbing  Neurological: Oriented to time, place, and person, Non-anxious  Psychiatric: Normal mood and affect  Skin: Warm and dry,  No rash seen     Outpatient Medications Marked as Taking for the 9/21/20 encounter (Office Visit) with Leoncio Avila MD   Medication Sig Dispense Refill    torsemide (DEMADEX) 20 MG tablet Take 20 mg by mouth daily      empagliflozin (JARDIANCE) 10 MG tablet Take 10 mg by mouth daily      carvedilol (COREG) 25 MG tablet Take 25 mg by mouth 2 times daily (with meals)      spironolactone (ALDACTONE) 25 MG tablet TAKE 1 TABLET BY MOUTH DAILY 90 tablet 3    losartan (COZAAR) 100 MG tablet Take 1 tablet by mouth daily 90 tablet 1    Lancets 30G MISC Inject 1 Stick into the skin      olopatadine (PATANOL) 0.1 % ophthalmic solution Apply to eye As needed for allergy symptoms      nicotine (NICODERM CQ) 21 MG/24HR       albuterol sulfate HFA (PROAIR HFA) 108 (90 Base) MCG/ACT inhaler 1-2 puffs every 4 hours while awake for 7-10 days then PRN wheezing  Dispense with SPACER and Instruct on use. May sub Ventolin or Proventil as needed per [employer]. 1 Inhaler 3    atorvastatin (LIPITOR) 10 MG tablet Take 1 tablet by mouth daily 30 tablet 3    metFORMIN (GLUCOPHAGE) 500 MG tablet Take 500 mg by mouth daily        Labs:   Lab Results   Component Value Date    HDL 34 2019    LDLCALC 81 2019    TRIG 137 2019       EK20, reviewed   20, sinus rhythm      Stress test: 2018  Negative treadmill exercise stress test.  The patient ex. for 7.11 min. on the Aftab protocol to achieve a HR of 150  which is 88 % of PMHR. No Chest pain or ischemic ST changes    ECHO: 19  Left ventricular cavity size is mildly dilated. There is mild conc. LVH. EF 40%. There is moderate diffuse hypokinesis. Grade II diastolic dysfunction. Mitral valve leaflets appear mildly thickened. robably mild mitral regurgitation. Regurgitant jet is not well visualized. No evidence of mitral stenosis. The left atrium is moderately dilated. Stress Test: 19     Cyril Kassandra is some breast tissue attenuation (artifact) on both stress and rest    images.    There is normal isotope uptake at stress and rest. There is no evidence of    myocardial ischemia or scar. TID of 0.97    Severely dilated LV with midly depressed systolic function.    Left ventricular ejection fraction of 40 %.  The inferior, apical and septal    walls are more hypokinetic compared with other segments.    Overall findings represent a intermediate to high risk scan.         Peak HR:111 bpm                  HR response: Normal    Peak BP:161/88 mmHg              BP response: Normal    Predicted HR: 171 bpm            HR/BP product:24705    % of predicted HR: 65    Test duration: 1 min and 40 sec    Reason for termination:Completed        ECG Findings    Normal sinus rhythm.    No ischemic EKG changes. Coronary angiography: 4/21/20  1. The coronary arteries are normal.  2.  LAD is free of disease. 3.  Circumflex is free of disease. 4.  Right coronary artery is the dominant vessel and is free of disease.     CONCLUSION:  1. Normal coronary arteries. 2.  Elevated left heart filling pressures. 3.  Nonischemic cardiomyopathy with EF of about 35%.       ASSESSMENT AND PLAN:    Combined systolic / diastolic heart failure    EF 35% by Cath 4/2020  Normal coronary arteries   Weight stable  Will stop Losartan and initiate Entresto 49-41 mg BID  Advised her to take an extra dose of Torsemide for 2-3 days only, should she notice worsening SOB, weight gain or LE edema   Reinforced fluid and sodium restriction  Will repeat Echo at next visit   Repeat BMP in 2 weeks   NYHA Class II-III    Essential Hypertension  BP is elevated on today's exam  Again, had lengthy discussion about medication compliance and consequences   Previously on Amlodipine which was discontinued d/t LE edema   Lisinopril stopped d/t cough  Will stop Losartan and initiate Entresto 49-51 mg BID   If Entresto not approved, will have her take Valsartan 320 mg QD  Repeat renal panel in 2 weeks      LAURA  Managed with Cpap     DM  Takes Metformin and Jardiance      LE Edema   Improved after stopping Amlodipine    Tobacco Abuse  Started smoking again  Smokes 0.5 ppd  Discussed smoking cessation       Follow up in 2 months       Thank you very much for allowing me to participate in the care of your patient.  Please do not hesitate to contact me if you have any questions. Sincerely,    Arlette Thomas M.D  [facility], [address] [place], [place] [address]Michele conley [place] [address]  Ph: [phone]  Fax: [phone]    This note was scribed in the presence of Dr. Arlette Thomas MD by [facility]  Physician Attestation:  The scribes documentation has been prepared under my direction and personally reviewed by me in its entirety. I confirm that the note above accurately reflects all work, treatment, procedures, and medical decision making performed by me.

## 2020-09-21 ENCOUNTER — OFFICE VISIT (OUTPATIENT)
Dept: CARDIOLOGY CLINIC | Age: 50
End: 2020-09-21
Payer: COMMERCIAL

## 2020-09-21 VITALS
HEART RATE: 85 BPM | WEIGHT: 207.4 LBS | DIASTOLIC BLOOD PRESSURE: 83 MMHG | OXYGEN SATURATION: 96 % | SYSTOLIC BLOOD PRESSURE: 180 MMHG | BODY MASS INDEX: 35.41 KG/M2 | HEIGHT: 64 IN | TEMPERATURE: 97 F

## 2020-09-21 PROCEDURE — 99214 OFFICE O/P EST MOD 30 MIN: CPT | Performed by: INTERNAL MEDICINE

## 2020-09-21 PROCEDURE — 93000 ELECTROCARDIOGRAM COMPLETE: CPT | Performed by: INTERNAL MEDICINE

## 2020-09-21 RX ORDER — CARVEDILOL 25 MG/1
25 TABLET ORAL 2 TIMES DAILY WITH MEALS
COMMUNITY

## 2020-09-21 RX ORDER — TORSEMIDE 20 MG/1
20 TABLET ORAL DAILY
COMMUNITY

## 2020-09-21 NOTE — PATIENT INSTRUCTIONS
ibuprofen. Some of these medicines can raise blood pressure. · Learn how to check your blood pressure at home. Lifestyle changes  · Stay at a healthy weight. This is especially important if you put on weight around the waist. Losing even 10 pounds can help you lower your blood pressure. · If your doctor recommends it, get more exercise. Walking is a good choice. Bit by bit, increase the amount you walk every day. Try for at least 30 minutes on most days of the week. You also may want to swim, bike, or do other activities. · Avoid or limit alcohol. Talk to your doctor about whether you can drink any alcohol. · Try to limit how much sodium you eat to less than 2,300 milligrams (mg) a day. Your doctor may ask you to try to eat less than 1,500 mg a day. · Eat plenty of fruits (such as bananas and oranges), vegetables, legumes, whole grains, and low-fat dairy products. · Lower the amount of saturated fat in your diet. Saturated fat is found in animal products such as milk, cheese, and meat. Limiting these foods may help you lose weight and also lower your risk for heart disease. · Do not smoke. Smoking increases your risk for heart attack and stroke. If you need help quitting, talk to your doctor about stop-smoking programs and medicines. These can increase your chances of quitting for good. When should you call for help? Call  911 anytime you think you may need emergency care. This may mean having symptoms that suggest that your blood pressure is causing a serious heart or blood vessel problem. Your blood pressure may be over 180/120. For example, call 911 if:  · You have symptoms of a heart attack. These may include:  ? Chest pain or pressure, or a strange feeling in the chest.  ? Sweating. ? Shortness of breath. ? Nausea or vomiting. ? Pain, pressure, or a strange feeling in the back, neck, jaw, or upper belly or in one or both shoulders or arms. ? Lightheadedness or sudden weakness.   ? A fast or important information I should know about sacubitril and valsartan? Do not use if you are pregnant, and tell your doctor right away if you become pregnant. If you have diabetes, do not use sacubitril and valsartan together with any medication that contains aliskiren (a blood pressure medicine). What is sacubitril and valsartan? Sacubitril and valsartan are blood pressure medicines. Valsartan is an angiotensin II receptor blocker (sometimes called an ARB). Sacubitril and valsartan is a combination medicine that is used in certain people with chronic heart failure. This medicine helps lower the risk of needing to be hospitalized when symptoms get worse, and helps lower the risk of death from heart failure. Sacubitril and valsartan is also used to treat heart failure in children who are at least 3year old. Sacubitril and valsartan is usually given together with other heart medications. Sacubitril and valsartan may also be used for purposes not listed in this medication guide. What should I discuss with my healthcare provider before taking sacubitril and valsartan? You should not use this medicine if you are allergic to sacubitril or valsartan (Diovan), or if you have ever had a severe allergic reaction to a blood pressure medication such as:  · an ACE inhibitor--benazepril, captopril, enalapril, fosinopril, lisinopril, moexipril, perindopril, quinapril, ramipril, trandolapril (Lotensin, Vasotec, Prinivil, Accupril, Mavik, and others); or  · an ARB--azilsartan, candesartan, eprosartan, irbesartan, losartan, olmesartan, telmisartan, valsartan (Atacand, Avapro, Benicar, Diovan, Edarbi, Micardis, Teveten, and others). You should not take sacubitril and valsartan within 36 hours before or after you have taken any ACE inhibitor medication. If you have diabetes, do not use sacubitril and valsartan together with any medication that contains aliskiren (a blood pressure medicine).   You may also need to avoid taking sacubitril and valsartan with aliskiren if you have kidney disease. Tell your doctor if you have ever had:  · liver disease;  · hereditary angioedema; or  · if you are on a low-salt-diet. Do not use if you are pregnant, and tell your doctor right away if you become pregnant. Sacubitril and valsartan can cause injury or death to the unborn baby if you take the medicine during your second or third trimester. You should not breast-feed while using this medicine. How should I take sacubitril and valsartan? Follow all directions on your prescription label and read all medication guides or instruction sheets. Your doctor may occasionally change your dose. Use the medicine exactly as directed. You may take this medicine with or without food. Sacubitril and valsartan doses are based on weight in children. Your child's dose needs may change if the child gains or loses weight. If you cannot swallow a tablet whole, a pharmacist can make an oral suspension (liquid). Tell the doctor if the person taking this medicine has trouble swallowing the tablet. Shake the oral suspension (liquid) before you measure a dose. Use the dosing syringe provided, or use a medicine dose-measuring device (not a kitchen spoon). Your blood pressure will need to be checked often. Your kidney function may also need to be checked. Store at room temperature away from moisture and heat. Throw away any oral suspension not used within 15 days after it was mixed. Do not keep the oral suspension in a refrigerator. What happens if I miss a dose? Take the medicine as soon as you can, but skip the missed dose if it is almost time for your next dose. Do not take two doses at one time. What happens if I overdose? Seek emergency medical attention or call the Poison Help line at 1-256.459.1409. What should I avoid while taking sacubitril and valsartan?   Do not use potassium supplements or salt substitutes, unless your doctor has told you to.  Avoid getting up too fast from a sitting or lying position, or you may feel dizzy. What are the possible side effects of sacubitril and valsartan? Get emergency medical help if you have signs of an allergic reaction: hives; difficulty breathing; swelling of your face, lips, tongue, or throat. You may be more likely to have an allergic reaction if you are -American. Also call your doctor at once if you have:  · a light-headed feeling, like you might pass out;  · extreme tiredness;  · high potassium --slow heart rate, weak pulse, muscle weakness, tingly feeling; or  · kidney problems --little or no urination, rapid weight gain, painful or difficult urination, swelling in your hands, feet, or ankles. Common side effects may include:  · dizziness; or  · cough. This is not a complete list of side effects and others may occur. Call your doctor for medical advice about side effects. You may report side effects to FDA at 5-165-FDA-3940. What other drugs will affect sacubitril and valsartan? Tell your doctor about all your current medicines and any you start or stop using, especially:  · aliskiren;  · lithium;  · any other heart or blood pressure medicines;  · a diuretic or \"water pill\";  · medicine or mineral supplements that contain potassium; or  · NSAIDs (nonsteroidal anti-inflammatory drugs) --aspirin, ibuprofen (Advil, Motrin), naproxen (Aleve), celecoxib, diclofenac, indomethacin, meloxicam, and others. This list is not complete. Other drugs may affect sacubitril and valsartan, including prescription and over-the-counter medicines, vitamins, and herbal products. Not all possible drug interactions are listed here. Where can I get more information? Your pharmacist can provide more information about sacubitril and valsartan. Remember, keep this and all other medicines out of the reach of children, never share your medicines with others, and use this medication only for the indication prescribed. Every effort has been made to ensure that the information provided by Janee Veliz Dr is accurate, up-to-date, and complete, but no guarantee is made to that effect. Drug information contained herein may be time sensitive. [facility] information has been compiled for use by healthcare practitioners and consumers in the [place] and therefore [facility] does not warrant that uses outside of the [place] are appropriate, unless specifically indicated otherwise. [facility]'s drug information does not endorse drugs, diagnose patients or recommend therapy. [facility]'s drug information is an informational resource designed to assist licensed healthcare practitioners in caring for their patients and/or to serve consumers viewing this service as a supplement to, and not a substitute for, the expertise, skill, knowledge and judgment of healthcare practitioners. The absence of a warning for a given drug or drug combination in no way should be construed to indicate that the drug or drug combination is safe, effective or appropriate for any given patient. [facility] does not assume any responsibility for any aspect of healthcare administered with the aid of information [facility] provides. The information contained herein is not intended to cover all possible uses, directions, precautions, warnings, drug interactions, allergic reactions, or adverse effects. If you have questions about the drugs you are taking, check with your doctor, nurse or pharmacist.  Copyright 6764-6276 [address] [place] [address] Dr MOSLEY. Version: 4.01. Revision date: 11/20/2019. Care instructions adapted under license by [facility] ([facility]). If you have questions about a medical condition or this instruction, always ask your healthcare professional. [place] [address] any warranty or liability for your use of this information. 1. Stop Losartan  2. Begin Entresto 49-51 mg BID (sacubitril and valsartan)  3. Repeat labs in 2 weeks  4.  Reduce sodium in diet  5. 64 oz fluid restriction   6.  Follow up in 2 months

## 2020-09-29 ENCOUNTER — NURSE ONLY (OUTPATIENT)
Dept: CARDIOLOGY CLINIC | Age: 50
End: 2020-09-29

## 2020-09-29 VITALS — SYSTOLIC BLOOD PRESSURE: 138 MMHG | DIASTOLIC BLOOD PRESSURE: 66 MMHG

## 2020-09-29 DIAGNOSIS — I50.42 CHRONIC COMBINED SYSTOLIC AND DIASTOLIC HEART FAILURE (HCC): ICD-10-CM

## 2020-09-29 LAB
ANION GAP SERPL CALCULATED.3IONS-SCNC: 10 MMOL/L (ref 3–16)
BUN BLDV-MCNC: 14 MG/DL (ref 7–20)
CALCIUM SERPL-MCNC: 9 MG/DL (ref 8.3–10.6)
CHLORIDE BLD-SCNC: 100 MMOL/L (ref 99–110)
CO2: 28 MMOL/L (ref 21–32)
CREAT SERPL-MCNC: 1.2 MG/DL (ref 0.6–1.1)
GFR AFRICAN AMERICAN: 57
GFR NON-AFRICAN AMERICAN: 47
GLUCOSE BLD-MCNC: 168 MG/DL (ref 70–99)
POTASSIUM SERPL-SCNC: 4.1 MMOL/L (ref 3.5–5.1)
SODIUM BLD-SCNC: 138 MMOL/L (ref 136–145)

## 2020-11-25 ENCOUNTER — OFFICE VISIT (OUTPATIENT)
Dept: CARDIOLOGY CLINIC | Age: 50
End: 2020-11-25
Payer: COMMERCIAL

## 2020-11-25 VITALS
SYSTOLIC BLOOD PRESSURE: 142 MMHG | BODY MASS INDEX: 35.85 KG/M2 | OXYGEN SATURATION: 95 % | HEART RATE: 84 BPM | DIASTOLIC BLOOD PRESSURE: 76 MMHG | WEIGHT: 210 LBS | HEIGHT: 64 IN | TEMPERATURE: 96 F

## 2020-11-25 DIAGNOSIS — R60.0 BILATERAL LEG EDEMA: ICD-10-CM

## 2020-11-25 DIAGNOSIS — Z72.0 TOBACCO ABUSE: ICD-10-CM

## 2020-11-25 DIAGNOSIS — I50.42 CHRONIC COMBINED SYSTOLIC AND DIASTOLIC HEART FAILURE (HCC): Primary | ICD-10-CM

## 2020-11-25 DIAGNOSIS — I10 ESSENTIAL HYPERTENSION: ICD-10-CM

## 2020-11-25 PROCEDURE — 99214 OFFICE O/P EST MOD 30 MIN: CPT | Performed by: INTERNAL MEDICINE

## 2020-11-25 RX ORDER — SACUBITRIL AND VALSARTAN 97; 103 MG/1; MG/1
1 TABLET, FILM COATED ORAL 2 TIMES DAILY
Qty: 180 TABLET | Refills: 3 | Status: SHIPPED | OUTPATIENT
Start: 2020-11-25

## 2020-11-25 NOTE — PROGRESS NOTES
[facility]  Cardiology Progress Note      Annamarie Dakins  1970, 45 y.o.      CC: \" I am doing good. \"       Roman Valencia:      HPI:   This is a 45 y.o. female with combined systolic and diastolic heart failure, non ischemic cardiomyopathy, hypertension, LAURA, DM and tobacco use. Negative stress nuclear and cath in the remote past that was  \" normal.\" Admitted to [facility] 5/1/19-5/3/19 for evaluation of chest pain and dyspnea. She reported running out of medication. CXR revealed pulmonary edema. She was diagnosed and treated for acute on chronic heart failure; diuresed. ECO and stress test performed showing LVEF of 40% and intermediate to high risk scan. She presented to [facility] 4/21/20 for C d/t complaints of CP and abnormal stress test. LHC revealed normal coronary arteries with LVEF of 35%. [place] was [place] [facility] [place] for CHF. She comes for follow up today and says she has been taking all medication. Says she purchased a BP cuff and occasionally checks her BP at home. Says systolic pressure typically runs 130's-140's. Says she has been stressed since July. Mother had coronavirus in July (2020) and is still hospitalized at [address] [place] [address]. Today, she states that she has been taking her medical therapy and tolerating. She denies any new or recurrent cardiac complaints today. She continues with chronic DURBIN, unchanged. Continues smoking daily. Patient denies exertional chest pain/pressure, dyspnea at rest, DURBIN, PND, orthopnea, palpitations, lightheadedness, weight changes, changes in LE edema, and syncope.       Past Medical History:   Diagnosis Date    [facility])     Diabetes mellitus ([facility] [address]     Hypertension     Hypokalemia     Nicotine addiction     LAURA (obstructive sleep apnea)       Past Surgical History:   Procedure Laterality Date    CARPAL TUNNEL RELEASE      DIAGNOSTIC CARDIAC CATH LAB PROCEDURE        Family History   Problem Relation Age of Onset  Heart Failure Maternal Aunt       Social History     Tobacco Use    Smoking status: Current Every Day Smoker     Packs/day: 0.50     Types: Cigarettes    Smokeless tobacco: Never Used   Substance Use Topics    Alcohol use: Yes     Alcohol/week: 0.0 - 1.0 standard drinks     Comment: occasional    Drug use: No     No Known Allergies      Review of Systems -   Constitutional: Negative for weight gain/loss; malaise, fever  Respiratory: Negative for Asthma;  cough and hemoptysis  Cardiovascular: Negative for palpitations,dizziness   Gastrointestinal: Negative for abd.pain; constipation/diarrhea;    Genitourinary: Negative for stones; hematuria; frequency hesitancy  Integumentt: Negative for rash or pruritis  Hematologic/lymphatic: Negative for blood dyscrasia; leukemia/lymphoma  Musculoskeletal: Negative for Connective tissue disease  Neurological:  Negative for Seizure   Behavioral/Psych:Negative for Bipolar disorder, Schizophrenia; Dementia  Endocrine: negative for thyroid, parathyroid disease    Physical Examination:    BP (!) 142/76   Pulse 84   Temp 96 °F (35.6 °C)   Ht 5' 4\" (1.626 m)   Wt 210 lb (95.3 kg)   LMP 05/01/2018   SpO2 95%   BMI 36.05 kg/m²    HEENT:  Face: Atraumatic, Conjunctiva: Pink; non icteric,  Mucous Memb:  Moist, No thyromegaly or Lymphadenopathy  Respiratory:  Resp Assessment: normal, Resp Auscultation: clear  Cardiovascular: Auscultation: nl S1 & S2, Palpation:  Nl PMI;  No heaves or thrills, JVP:  normal  Abdomen: Soft, non-tender, Normal bowel sounds,  No organomegaly  Extremities: No Cyanosis or Clubbing  Neurological: Oriented to time, place, and person, Non-anxious  Psychiatric: Normal mood and affect  Skin: Warm and dry,  No rash seen     Outpatient Medications Marked as Taking for the 11/25/20 encounter (Office Visit) with Sneha Malave MD   Medication Sig Dispense Refill    torsemide (DEMADEX) 20 MG tablet Take 20 mg by mouth daily  empagliflozin (JARDIANCE) 10 MG tablet Take 10 mg by mouth daily      carvedilol (COREG) 25 MG tablet Take 25 mg by mouth 2 times daily (with meals)      sacubitril-valsartan (ENTRESTO) 49-51 MG per tablet Take 1 tablet by mouth 2 times daily 60 tablet 5    spironolactone (ALDACTONE) 25 MG tablet TAKE 1 TABLET BY MOUTH DAILY 90 tablet 3    Lancets 30G MISC Inject 1 Stick into the skin      olopatadine (PATANOL) 0.1 % ophthalmic solution Apply to eye As needed for allergy symptoms      albuterol sulfate HFA (PROAIR HFA) 108 (90 Base) MCG/ACT inhaler 1-2 puffs every 4 hours while awake for 7-10 days then PRN wheezing  Dispense with SPACER and Instruct on use. May sub Ventolin or Proventil as needed per [employer]. 1 Inhaler 3    atorvastatin (LIPITOR) 10 MG tablet Take 1 tablet by mouth daily 30 tablet 3    metFORMIN (GLUCOPHAGE) 500 MG tablet Take 500 mg by mouth daily        Labs:   Lab Results   Component Value Date     2020      Lab Results   Component Value Date    HGB 14.2 2020    HCT 43.6 2020     Lab Results   Component Value Date     2020     Lab Results   Component Value Date    K 4.1 2020    K 3.6 2018     Lab Results   Component Value Date    HDL 34 2019    LDLCALC 81 2019    TRIG 137 2019       EK20, sinus rhythm  20 not completed today     Stress test: 2018  Negative treadmill exercise stress test.  The patient ex. for 7.11 min. on the Aftab protocol to achieve a HR of 150  which is 88 % of PMHR. No Chest pain or ischemic ST changes    ECHO: 19  Left ventricular cavity size is mildly dilated. There is mild conc. LVH. EF 40%. There is moderate diffuse hypokinesis. Grade II diastolic dysfunction. Mitral valve leaflets appear mildly thickened. robably mild mitral regurgitation. Regurgitant jet is not well visualized. No evidence of mitral stenosis. The left atrium is moderately dilated. Stress Test: 5/1/19     Burrell Closs is some breast tissue attenuation (artifact) on both stress and rest    images.    There is normal isotope uptake at stress and rest. There is no evidence of    myocardial ischemia or scar. TID of 0.97    Severely dilated LV with midly depressed systolic function.    Left ventricular ejection fraction of 40 %. The inferior, apical and septal    walls are more hypokinetic compared with other segments.    Overall findings represent a intermediate to high risk scan.         Peak HR:111 bpm                  HR response: Normal    Peak BP:161/88 mmHg              BP response: Normal    Predicted HR: 171 bpm            HR/BP product:59557    % of predicted HR: 65    Test duration: 1 min and 40 sec    Reason for termination:Completed        ECG Findings    Normal sinus rhythm.    No ischemic EKG changes. Coronary angiography: 4/21/20  1. The coronary arteries are normal.  2.  LAD is free of disease. 3.  Circumflex is free of disease. 4.  Right coronary artery is the dominant vessel and is free of disease.     CONCLUSION:  1. Normal coronary arteries. 2.  Elevated left heart filling pressures. 3.  Nonischemic cardiomyopathy with EF of about 35%.       ASSESSMENT AND PLAN:    Combined systolic / diastolic heart failure    EF 35% by Cath 4/2020  Normal coronary arteries   Weight is up a few pounds since last visit   chronic DURBIN and unchanged per patient   Will increase Entresto from 49-51 mg BID to  mg BID. Continue Torsemide, coreg and aldactone   Reinforced fluid and sodium restriction with education given. Will repeat Echo at next visit once OMT is maximized.    Repeat BMP in 2 weeks after increasing entresto     NYHA Class II-III    Essential Hypertension  BP is elevated on today's exam  Again, had lengthy discussion about medication compliance and consequences   Previously on Amlodipine which was discontinued d/t LE edema   Lisinopril stopped d/t cough She is on Entresto with increase dose today BID, coreg BID and aldactone. Remain on Torsemide 20 mg daily. Repeat renal panel in 2 weeks  Space out medical therapy. Tobacco Abuse  Started smoking again, Smokes 0.5 ppd  Discussed smoking cessation    LAURA  Managed with Cpap     DM  Takes Metformin and Jardiance         Follow up in 3 months       Thank you very much for allowing me to participate in the care of your patient. Please do not hesitate to contact me if you have any questions. Sincerely,    Shaun Bazzi M.D  [facility], [address] [place] [address]  Ph: [phone]  Fax: [phone]    This note was scribed in the presence of Dr. Benton Lr. Raghav Huynh MD by Hoa Gregorio RN.

## 2020-11-25 NOTE — PATIENT INSTRUCTIONS
Patient Education        Avoiding Triggers With Heart Failure: Care Instructions  Your Care Instructions     Triggers are anything that make your heart failure flare up. A flare-up is also called \"sudden heart failure\" or \"acute heart failure. \" When you have a flare-up, fluid builds up in your lungs, and you have problems breathing. You might need to go to the hospital. By watching for changes in your condition and avoiding triggers, you can prevent heart failure flare-ups. Follow-up care is a key part of your treatment and safety. Be sure to make and go to all appointments, and call your doctor if you are having problems. It's also a good idea to know your test results and keep a list of the medicines you take. How can you care for yourself at home? Watch for changes in your weight and condition  · Weigh yourself without clothing at the same time each day. Record your weight. Call your doctor if you have sudden weight gain, such as more than 2 to 3 pounds in a day or 5 pounds in a week. (Your doctor may suggest a different range of weight gain.) A sudden weight gain may mean that your heart failure is getting worse. · Keep a daily record of your symptoms. Write down any changes in how you feel, such as new shortness of breath, cough, or problems eating. Also record if your ankles are more swollen than usual and if you feel more tired than usual. Note anything that you ate or did that could have triggered these changes. Limit sodium  Sodium causes your body to hold on to extra water. This may cause your heart failure symptoms to get worse. People get most of their sodium from processed foods. Fast food and restaurant meals also tend to be very high in sodium. · Your doctor may suggest that you limit sodium. Your doctor can tell you how much sodium is right for you. This includes limiting sodium in cooked and packaged foods. · Read food labels on cans and food packages.  They tell you how much sodium you get in one serving. Check the serving size. If you eat more than one serving, you are getting more sodium. · Be aware that sodium can come in forms other than salt, including monosodium glutamate (MSG), sodium citrate, and sodium bicarbonate (baking soda). MSG is often added to Asian food. You can sometimes ask for food without MSG or salt. · Slowly reducing salt will help you adjust to the taste. Take the salt shaker off the table. · Flavor your food with garlic, lemon juice, onion, vinegar, herbs, and spices instead of salt. Do not use soy sauce, steak sauce, onion salt, garlic salt, mustard, or ketchup on your food, unless it is labeled \"low-sodium\" or \"low-salt. \"  · Make your own salad dressings, sauces, and ketchup without adding salt. · Use fresh or frozen ingredients, instead of canned ones, whenever you can. Choose low-sodium canned goods. · Eat less processed food and food from restaurants, including fast food. Exercise as directed  Moderate, regular exercise is very good for your heart. It improves your blood flow and helps control your weight. But too much exercise can stress your heart and cause a heart failure flare-up. · Check with your doctor before you start an exercise program.  · Walking is an easy way to get exercise. Start out slowly. Gradually increase the length and pace of your walk. Swimming, riding a bike, and using a treadmill are also good forms of exercise. · When you exercise, watch for signs that your heart is working too hard. You are pushing yourself too hard if you cannot talk while you are exercising. If you become short of breath or dizzy or have chest pain, stop, sit down, and rest.  · Do not exercise when you do not feel well. Take medicines correctly  · Take your medicines exactly as prescribed. Call your doctor if you think you are having a problem with your medicine. · Make a list of all the medicines you take.  Include those prescribed to you by other doctors and any over-the-counter medicines, vitamins, or supplements you take. Take this list with you when you go to any doctor. · Take your medicines at the same time every day. It may help you to post a list of all the medicines you take every day and what time of day you take them. · Make taking your medicine as simple as you can. Plan times to take your medicines when you are doing other things, such as eating a meal or getting ready for bed. This will make it easier to remember to take your medicines. · Get organized. Use helpful tools, such as daily or weekly pill containers. When should you call for help? Call 911 if you have symptoms of sudden heart failure such as:    · You have severe trouble breathing.     · You cough up pink, foamy mucus.     · You have a new irregular or rapid heartbeat. Call your doctor now or seek immediate medical care if:    · You have new or increased shortness of breath.     · You are dizzy or lightheaded, or you feel like you may faint.     · You have sudden weight gain, such as more than 2 to 3 pounds in a day or 5 pounds in a week. (Your doctor may suggest a different range of weight gain.)     · You have increased swelling in your legs, ankles, or feet.     · You are suddenly so tired or weak that you cannot do your usual activities. Watch closely for changes in your health, and be sure to contact your doctor if you develop new symptoms. Where can you learn more? Go to https://Cloudcity.Inveni. org and sign in to your [employer] account. Enter Q438 in the [employer] box to learn more about \"Avoiding Triggers With Heart Failure: Care Instructions. \"     If you do not have an account, please click on the \"Sign Up Now\" link. Current as of: December 16, 2019               Content Version: 12.6  © 2186-1588 [employer]. Care instructions adapted under license by [facility] ([facility]).  If you have questions about a medical condition or this instruction, always ask your healthcare professional. Inessa Pederson any warranty or liability for your use of this information. Patient Education        Learning About Low-Sodium Foods  What foods are low in sodium? The foods you eat contain nutrients, such as vitamins and minerals. Sodium is a nutrient. Your body needs the right amount to stay healthy and work as it should. You can use the list below to help you make choices about which foods to eat. Fruits  · Fresh, frozen, canned, or dried fruit  Vegetables  · Fresh or frozen vegetables, with no added salt  · Canned vegetables, low-sodium or with no added salt  Grains  · Bagels without salted tops  · Cereal with no added salt  · Corn tortillas  · Crackers with no added salt  · Oatmeal, cooked without salt  · Popcorn with no salt  · Pasta and noodles, cooked without salt  · Rice, cooked without salt  · Unsalted pretzels  Dairy and dairy alternatives  · Butter, unsalted  · Cream cheese  · Ice cream  · Milk  · Soy milk  Meats and other protein foods  · Beans and peas, canned with no salt  · Eggs  · Fresh fish (not smoked)  · Fresh meats (not smoked or cured)  · Nuts and nut butter, prepared without salt  · Poultry, not packaged with sodium solution  · Tofu, unseasoned  · Tuna, canned without salt  Seasonings  · Garlic  · Herbs and spices  · Lemon juice  · Mustard  · Olive oil  · Salt-free seasoning mixes  · Vinegar  Work with your doctor to find out how much of this nutrient you need. Depending on your health, you may need more or less of it in your diet. Where can you learn more? Go to https://AnyCloudcaraeb.Moonshoot. org and sign in to your [employer] account. Enter V388 in the [facility] box to learn more about \"Learning About Low-Sodium Foods. \"     If you do not have an account, please click on the \"Sign Up Now\" link. Current as of: August 22, 2019               Content Version: 12.6  © 9593-3986 [employer]. Care instructions adapted under license by [facility] ([facility]). If you have questions about a medical condition or this instruction, always ask your healthcare professional. [address] any warranty or liability for your use of this information. Patient Education        Walking for Exercise: Care Instructions  Your Care Instructions     Walking is one of the easiest ways to get the exercise you need for good health. A brisk, 30-minute walk each day can help you feel better and have more energy. It can help you lower your risk of disease. Walking can help you keep your bones strong and your heart healthy. Check with your doctor before you start a walking plan if you have heart problems, other health issues, or you have not been active in a long time. Follow your doctor's instructions for safe levels of exercise. Follow-up care is a key part of your treatment and safety. Be sure to make and go to all appointments, and call your doctor if you are having problems. It's also a good idea to know your test results and keep a list of the medicines you take. How can you care for yourself at home? Getting started  · Start slowly and set a short-term goal. For example, walk for 5 or 10 minutes every day. · Bit by bit, increase the amount you walk every day. Try for at least 30 minutes on most days of the week. You also may want to swim, bike, or do other activities. · If finding enough time is a problem, it is fine to be active in blocks of 10 minutes or more throughout your day and week. · To get the heart-healthy benefits of walking, you need to walk briskly enough to increase your heart rate and breathing, but not so fast that you cannot talk comfortably. · Wear comfortable shoes that fit well and provide good support for your feet and ankles.   Staying with your plan  · After you've made walking a habit, set a longer-term goal. You may want to set a goal of walking briskly for longer or walking farther. Experts say to do 2½ hours of moderate activity a week. A faster heartbeat is what defines moderate-level activity. · To stay motivated, walk with friends, coworkers, or pets. · Use a phone marivel or pedometer to track your steps each day. Set a goal to increase your steps. Once you get there, set a higher goal. Aim for 10,000 steps a day. · If the weather keeps you from walking outside, go for walks at the mall with a friend. Local schools and churches may have indoor gyms where you can walk. Fitting a walk into your workday  · Park several blocks away from work, or get off the bus a few stops early. · Use the stairs instead of the elevator, at least for a few floors. · Suggest holding meetings with colleagues during a walk inside or outside the building. · Use the restroom that is the farthest from your desk or workstation. · Use your morning and afternoon breaks to take quick 15-minute walks. Staying safe  · Know your surroundings. Walk in a well-lighted, safe place. If it is dark, walk with a partner. Wear light-colored clothing. If you can, buy a vest or jacket that reflects light. · Carry a cell phone for emergencies. · Drink plenty of water. Take a water bottle with you when you walk. This is very important if it is hot out. · Be careful not to slip on wet or icy ground. You can buy \"grippers\" for your shoes to help keep you from slipping. · Pay attention to your walking surface. Use sidewalks and paths. · If you have breathing problems like asthma or COPD, ask your doctor when it is safe for you to walk outdoors. Cold, dry air, smog, pollen, or other things in the air could cause breathing problems. Where can you learn more? Go to https://Knowledge Adventure.WebKite. org and sign in to your [employer] account. Enter R159 in the [employer] box to learn more about \"Walking for Exercise: Care Instructions. \"     If you do not have an account, please click on the \"Sign Up Now\" link. Current as of: January 16, 2020               Content Version: 12.6  © 8811-2216 [employer]. Care instructions adapted under license by [facility] ([facility]). If you have questions about a medical condition or this instruction, always ask your healthcare professional. [address] any warranty or liability for your use of this information.